# Patient Record
Sex: MALE | Race: AMERICAN INDIAN OR ALASKA NATIVE | NOT HISPANIC OR LATINO | Employment: OTHER | URBAN - METROPOLITAN AREA
[De-identification: names, ages, dates, MRNs, and addresses within clinical notes are randomized per-mention and may not be internally consistent; named-entity substitution may affect disease eponyms.]

---

## 2017-04-20 PROBLEM — R31.29 HEMATURIA, MICROSCOPIC: Status: ACTIVE | Noted: 2017-04-20

## 2017-09-27 PROBLEM — G47.33 OSA (OBSTRUCTIVE SLEEP APNEA): Status: ACTIVE | Noted: 2017-09-27

## 2017-09-28 PROBLEM — R06.02 SOB (SHORTNESS OF BREATH): Status: ACTIVE | Noted: 2017-09-28

## 2017-10-03 PROBLEM — G56.01 CARPAL TUNNEL SYNDROME OF RIGHT WRIST: Status: ACTIVE | Noted: 2017-10-03

## 2017-10-05 ENCOUNTER — HOSPITAL ENCOUNTER (OUTPATIENT)
Dept: SLEEP MEDICINE | Facility: HOSPITAL | Age: 37
Discharge: HOME | End: 2017-10-05
Attending: PHYSICIAN ASSISTANT
Payer: MEDICARE

## 2017-10-05 DIAGNOSIS — G47.33 OBSTRUCTIVE SLEEP APNEA SYNDROME: ICD-10-CM

## 2017-10-05 PROCEDURE — 95810 POLYSOM 6/> YRS 4/> PARAM: CPT

## 2017-10-25 ENCOUNTER — HOSPITAL ENCOUNTER (OUTPATIENT)
Dept: SLEEP MEDICINE | Facility: HOSPITAL | Age: 37
Discharge: HOME | End: 2017-10-25
Attending: INTERNAL MEDICINE
Payer: MEDICARE

## 2017-10-25 DIAGNOSIS — G47.33 OBSTRUCTIVE SLEEP APNEA SYNDROME: ICD-10-CM

## 2017-10-25 PROCEDURE — 95811 POLYSOM 6/>YRS CPAP 4/> PARM: CPT

## 2018-01-07 PROBLEM — J45.30 MILD PERSISTENT ASTHMA WITHOUT COMPLICATION: Status: ACTIVE | Noted: 2018-01-07

## 2018-02-19 PROBLEM — R45.851 SUICIDAL IDEATION: Status: ACTIVE | Noted: 2018-02-19

## 2018-04-15 PROBLEM — R06.09 DOE (DYSPNEA ON EXERTION): Status: ACTIVE | Noted: 2018-04-15

## 2018-05-14 PROBLEM — R06.02 SHORTNESS OF BREATH: Status: ACTIVE | Noted: 2018-05-14

## 2018-05-14 PROBLEM — R05.3 CHRONIC COUGH: Status: ACTIVE | Noted: 2018-05-14

## 2018-07-02 ENCOUNTER — HOSPITAL ENCOUNTER (OUTPATIENT)
Dept: SLEEP MEDICINE | Facility: HOSPITAL | Age: 38
Discharge: HOME | End: 2018-07-02
Attending: INTERNAL MEDICINE
Payer: MEDICARE

## 2018-07-02 DIAGNOSIS — G47.33 OBSTRUCTIVE SLEEP APNEA (ADULT) (PEDIATRIC): ICD-10-CM

## 2018-07-02 PROCEDURE — 95811 POLYSOM 6/>YRS CPAP 4/> PARM: CPT

## 2018-07-02 PROCEDURE — 95811 POLYSOM 6/>YRS CPAP 4/> PARM: CPT | Mod: 26,,, | Performed by: INTERNAL MEDICINE

## 2018-08-27 PROBLEM — M25.511 RIGHT SHOULDER PAIN: Status: ACTIVE | Noted: 2018-08-27

## 2019-06-16 PROBLEM — S46.912A STRAIN OF LEFT SHOULDER: Status: ACTIVE | Noted: 2019-06-16

## 2020-01-22 PROBLEM — J45.30 MILD PERSISTENT ASTHMA WITHOUT COMPLICATION: Chronic | Status: ACTIVE | Noted: 2018-01-07

## 2020-05-05 PROBLEM — G40.919 INTRACTABLE EPILEPSY WITHOUT STATUS EPILEPTICUS: Status: ACTIVE | Noted: 2020-05-05

## 2020-05-05 PROBLEM — F12.20 CANNABIS DEPENDENCE, CONTINUOUS: Status: ACTIVE | Noted: 2020-05-05

## 2020-07-07 PROBLEM — R45.4 OUTBURSTS OF ANGER: Status: ACTIVE | Noted: 2020-07-07

## 2020-08-17 ENCOUNTER — HOSPITAL ENCOUNTER (OUTPATIENT)
Dept: SLEEP MEDICINE | Facility: HOSPITAL | Age: 40
Discharge: HOME | End: 2020-08-17
Attending: NURSE PRACTITIONER
Payer: MEDICARE

## 2020-08-17 DIAGNOSIS — G47.33 OBSTRUCTIVE SLEEP APNEA (ADULT) (PEDIATRIC): ICD-10-CM

## 2020-08-17 PROCEDURE — 95810 POLYSOM 6/> YRS 4/> PARAM: CPT | Mod: 26,,, | Performed by: INTERNAL MEDICINE

## 2020-08-17 PROCEDURE — 95810 POLYSOM 6/> YRS 4/> PARAM: CPT

## 2020-08-17 PROCEDURE — 95810 PR POLYSOMNOGRAPHY, 4 OR MORE: ICD-10-PCS | Mod: 26,,, | Performed by: INTERNAL MEDICINE

## 2020-09-28 ENCOUNTER — HOSPITAL ENCOUNTER (EMERGENCY)
Facility: HOSPITAL | Age: 40
Discharge: HOME | End: 2020-09-28
Attending: SURGERY
Payer: MEDICARE

## 2020-09-28 VITALS
OXYGEN SATURATION: 99 % | BODY MASS INDEX: 27.48 KG/M2 | DIASTOLIC BLOOD PRESSURE: 80 MMHG | WEIGHT: 197 LBS | HEART RATE: 80 BPM | TEMPERATURE: 98 F | SYSTOLIC BLOOD PRESSURE: 131 MMHG | RESPIRATION RATE: 20 BRPM

## 2020-09-28 DIAGNOSIS — Z20.822 COVID-19 VIRUS NOT DETECTED: Primary | ICD-10-CM

## 2020-09-28 LAB — SARS-COV-2 RDRP RESP QL NAA+PROBE: NEGATIVE

## 2020-09-28 PROCEDURE — 99282 EMERGENCY DEPT VISIT SF MDM: CPT

## 2020-09-28 PROCEDURE — U0002 COVID-19 LAB TEST NON-CDC: HCPCS

## 2020-09-28 RX ORDER — AZITHROMYCIN 250 MG/1
TABLET, FILM COATED ORAL
Qty: 6 TABLET | Refills: 0 | Status: SHIPPED | OUTPATIENT
Start: 2020-09-28 | End: 2020-09-28 | Stop reason: ALTCHOICE

## 2020-09-28 RX ORDER — METHYLPREDNISOLONE 4 MG/1
TABLET ORAL
Qty: 1 PACKAGE | Refills: 0 | Status: SHIPPED | OUTPATIENT
Start: 2020-09-28 | End: 2020-09-28 | Stop reason: ALTCHOICE

## 2020-09-28 NOTE — ED PROVIDER NOTES
"[facility]                                                 Chief Complaint  40 y.o. male with COVID-19 Concerns ("i need the covid test for a sleep study")      History of Present Illness  Jostin Lim Jr. presents to the ER for COVID test this morning  Patient has no syndesmosis just like a test on ER evaluation this moring  Pt states that they have been in contact with several people with COVID virus  Patient has no evidence of hypoxia, no fever on  emergency room triage    The history is provided by the patient   device was not used during this ER visit    Past Medical History:   Diagnosis Date    Addiction to drug     marijuana 1-2 times daily    Asthma     Back pain     Carpal tunnel syndrome     GERD (gastroesophageal reflux disease)     Hand fracture     Insomnia     Knee pain     Seizures     2010    Sleep apnea     Substance abuse     marijuana     Past Surgical History:   Procedure Laterality Date    CARPAL TUNNEL RELEASE Right     CLOSED REDUCTION OF FRACTURE OF HAND WITH PERCUTANEOUS PINNING Right 1/23/2020    Procedure: CLOSED REDUCTION, FRACTURE, HAND, WITH PERCUTANEOUS PINNING;  Surgeon: Buck Velez MD;  Location: [facility];  Service: Orthopedics;  Laterality: Right;  4th and 5th [facility] DISLOCATION    ESOPHAGOGASTRODUODENOSCOPY N/A 8/3/2018    Procedure: EGD (ESOPHAGOGASTRODUODENOSCOPY);  Surgeon: Cl Soto MD;  Location: [facility];  Service: Endoscopy;  Laterality: N/A;    HERNIA REPAIR      VASECTOMY        Review of patient's allergies indicates:   Allergen Reactions    Percocet [oxycodone-acetaminophen] Hallucinations      I have reviewed all of this patient's past medical, surgical, family, and social   histories as well as active allergies and medications documented in the  electronic medical record    Review of Systems and Physical Exam      Review of Systems  -- Constitution - no fever, denies fatigue, no weakness, " no chills  -- Eyes - no tearing or redness, no visual disturbance  -- Ear, Nose - no tinnitus or earache, no nasal congestion or discharge  -- Mouth,Throat - no sore throat, no toothache, denies voice change, normal swallowing  -- Respiratory - denies cough and congestion, no shortness of breath, no REYNOSO, no wheeze  -- Cardiovascular - denies chest pain, no palpitations, denies claudication. No orthopnea   -- Gastrointestinal - denies abdominal pain, nausea, vomiting, diarrhea, or constipation.   -- Genitourinary - denies flank pain and dysuria, also denies frequency or urgency  -- Musculoskeletal - denies back pain, negative for myalgias and arthralgias.   -- Neurological - no headache, denies weakness or seizure; no loss of consciousness  -- Skin - denies pallor, rash, or changes in skin, no hives or welts    Vital Signs  His oral temperature is 98 °F (36.7 °C).   His pulse is 80.   His respiration is 20 and oxygen saturation is 99%.     Physical Exam  -- Nursing note and vitals reviewed  -- Constitutional: Appears well-developed and well-nourished  -- Head: Atraumatic. Normocephalic. No obvious abnormality  -- Eyes: Pupils are equal and reactive to light. Normal conjunctiva and lids  -- Nose: Nose normal in appearance, nares grossly normal. No discharge  -- Throat: Mucous membranes moist, pharynx normal, normal tonsils. No lesions   -- Ears: External ears and TM normal bilaterally. Normal hearing and no drainage  -- Neck: Normal range of motion. Neck supple. No masses, trachea midline  -- Cardiac: Normal rate, regular rhythm and normal heart sounds  -- Pulmonary: Normal respiratory effort, breath sounds clear to auscultation  -- Abdominal: Soft, no tenderness. Normal bowel sounds. Normal liver edge  -- Musculoskeletal: Normal range of motion, no effusions. Joints stable   -- Neurological: No focal deficits. Showed good interaction with staff  -- Vascular: Posterior tibial, dorsalis pedis and radial pulses 2+  bilaterally      Emergency Room Course      Treatment and Evaluation  -- rapid Coronavirus PCR was negative    Diagnosis  [[id number]] Covid-19 Virus not Detected (Primary)    Disposition and Plan  -- Disposition: home  -- Condition: stable  -- Follow-up: Patient to follow up with Arvin Ramires MD in 1-2 days.  -- I advised the patient that we have found no life threatening condition today  -- At this time, I believe the patient is clinically stable for discharge.   -- The patient acknowledges that close follow up with a MD is required   -- Patient agrees to comply with all instruction and direction given in the ER    This note is dictated on M*Modal word recognition program.  There are word recognition mistakes that are occasionally missed on review.         Cristofer Terrell MD  09/28/20 1144

## 2020-09-28 NOTE — ED TRIAGE NOTES
"40 y.o. male presents to ER RWR 02/RWR 02   Chief Complaint   Patient presents with    COVID-19 Concerns     "i need the covid test for a sleep study"   . No acute distress noted.  "

## 2020-09-30 ENCOUNTER — HOSPITAL ENCOUNTER (OUTPATIENT)
Dept: SLEEP MEDICINE | Facility: HOSPITAL | Age: 40
Discharge: HOME | End: 2020-09-30
Attending: NURSE PRACTITIONER
Payer: MEDICARE

## 2020-09-30 DIAGNOSIS — G47.33 OBSTRUCTIVE SLEEP APNEA (ADULT) (PEDIATRIC): ICD-10-CM

## 2020-09-30 PROCEDURE — 95811 POLYSOM 6/>YRS CPAP 4/> PARM: CPT

## 2020-09-30 PROCEDURE — 95811 POLYSOM 6/>YRS CPAP 4/> PARM: CPT | Mod: 26,,, | Performed by: INTERNAL MEDICINE

## 2020-09-30 PROCEDURE — 95811 PR POLYSOMNOGRAPHY W/CPAP: ICD-10-PCS | Mod: 26,,, | Performed by: INTERNAL MEDICINE

## 2021-01-04 PROBLEM — K08.89 PAIN, DENTAL: Status: ACTIVE | Noted: 2021-01-04

## 2022-01-13 ENCOUNTER — LAB VISIT (OUTPATIENT)
Dept: PRIMARY CARE CLINIC | Facility: OTHER | Age: 42
End: 2022-01-13
Attending: INTERNAL MEDICINE
Payer: MEDICARE

## 2022-01-13 DIAGNOSIS — Z20.822 ENCOUNTER FOR LABORATORY TESTING FOR COVID-19 VIRUS: Primary | ICD-10-CM

## 2022-01-13 PROCEDURE — U0003 INFECTIOUS AGENT DETECTION BY NUCLEIC ACID (DNA OR RNA); SEVERE ACUTE RESPIRATORY SYNDROME CORONAVIRUS 2 (SARS-COV-2) (CORONAVIRUS DISEASE [COVID-19]), AMPLIFIED PROBE TECHNIQUE, MAKING USE OF HIGH THROUGHPUT TECHNOLOGIES AS DESCRIBED BY CMS-2020-01-R: HCPCS | Performed by: INTERNAL MEDICINE

## 2022-01-14 DIAGNOSIS — U07.1 COVID-19 VIRUS DETECTED: ICD-10-CM

## 2022-01-14 LAB
SARS-COV-2 RNA RESP QL NAA+PROBE: DETECTED
SARS-COV-2- CYCLE NUMBER: 20

## 2022-03-15 ENCOUNTER — ANESTHESIA EVENT (OUTPATIENT)
Dept: SURGERY | Facility: HOSPITAL | Age: 42
End: 2022-03-15
Payer: MEDICARE

## 2022-03-15 ENCOUNTER — ANESTHESIA (OUTPATIENT)
Dept: SURGERY | Facility: HOSPITAL | Age: 42
End: 2022-03-15
Payer: MEDICARE

## 2022-03-15 ENCOUNTER — HOSPITAL ENCOUNTER (OUTPATIENT)
Facility: HOSPITAL | Age: 42
Discharge: HOME | End: 2022-03-15
Attending: STUDENT IN AN ORGANIZED HEALTH CARE EDUCATION/TRAINING PROGRAM | Admitting: SURGERY
Payer: MEDICARE

## 2022-03-15 VITALS
WEIGHT: 190.38 LBS | DIASTOLIC BLOOD PRESSURE: 85 MMHG | TEMPERATURE: 97 F | OXYGEN SATURATION: 96 % | HEART RATE: 62 BPM | RESPIRATION RATE: 18 BRPM | BODY MASS INDEX: 28.11 KG/M2 | SYSTOLIC BLOOD PRESSURE: 124 MMHG

## 2022-03-15 DIAGNOSIS — N34.0 ABSCESS OF DEEP PERINEAL SPACE: ICD-10-CM

## 2022-03-15 DIAGNOSIS — L02.91 ABSCESS: ICD-10-CM

## 2022-03-15 DIAGNOSIS — L02.214 ABSCESS OF LEFT GROIN: Primary | ICD-10-CM

## 2022-03-15 LAB
ALBUMIN SERPL BCP-MCNC: 3.8 G/DL (ref 3.5–5.2)
ALP SERPL-CCNC: 115 U/L (ref 55–135)
ALT SERPL W/O P-5'-P-CCNC: 11 U/L (ref 10–44)
ANION GAP SERPL CALC-SCNC: 11 MMOL/L (ref 8–16)
AST SERPL-CCNC: 11 U/L (ref 10–40)
BASOPHILS # BLD AUTO: 0.04 K/UL (ref 0–0.2)
BASOPHILS NFR BLD: 0.4 % (ref 0–1.9)
BILIRUB SERPL-MCNC: 0.4 MG/DL (ref 0.1–1)
BUN SERPL-MCNC: 10 MG/DL (ref 6–20)
CALCIUM SERPL-MCNC: 9.6 MG/DL (ref 8.7–10.5)
CHLORIDE SERPL-SCNC: 95 MMOL/L (ref 95–110)
CO2 SERPL-SCNC: 27 MMOL/L (ref 23–29)
CREAT SERPL-MCNC: 1.1 MG/DL (ref 0.5–1.4)
DIFFERENTIAL METHOD: ABNORMAL
EOSINOPHIL # BLD AUTO: 0 K/UL (ref 0–0.5)
EOSINOPHIL NFR BLD: 0.4 % (ref 0–8)
ERYTHROCYTE [DISTWIDTH] IN BLOOD BY AUTOMATED COUNT: 12.5 % (ref 11.5–14.5)
EST. GFR  (AFRICAN AMERICAN): >60 ML/MIN/1.73 M^2
EST. GFR  (NON AFRICAN AMERICAN): >60 ML/MIN/1.73 M^2
GLUCOSE SERPL-MCNC: 98 MG/DL (ref 70–110)
HCT VFR BLD AUTO: 36.3 % (ref 40–54)
HGB BLD-MCNC: 12.6 G/DL (ref 14–18)
IMM GRANULOCYTES # BLD AUTO: 0.03 K/UL (ref 0–0.04)
IMM GRANULOCYTES NFR BLD AUTO: 0.3 % (ref 0–0.5)
LACTATE SERPL-SCNC: 0.7 MMOL/L (ref 0.5–2.2)
LYMPHOCYTES # BLD AUTO: 1.8 K/UL (ref 1–4.8)
LYMPHOCYTES NFR BLD: 19.7 % (ref 18–48)
MCH RBC QN AUTO: 33.7 PG (ref 27–31)
MCHC RBC AUTO-ENTMCNC: 34.7 G/DL (ref 32–36)
MCV RBC AUTO: 97 FL (ref 82–98)
MONOCYTES # BLD AUTO: 0.7 K/UL (ref 0.3–1)
MONOCYTES NFR BLD: 7.5 % (ref 4–15)
NEUTROPHILS # BLD AUTO: 6.6 K/UL (ref 1.8–7.7)
NEUTROPHILS NFR BLD: 71.7 % (ref 38–73)
NRBC BLD-RTO: 0 /100 WBC
PLATELET # BLD AUTO: 249 K/UL (ref 150–450)
PMV BLD AUTO: 8.7 FL (ref 9.2–12.9)
POTASSIUM SERPL-SCNC: 3.5 MMOL/L (ref 3.5–5.1)
PROCALCITONIN SERPL IA-MCNC: 0.03 NG/ML
PROT SERPL-MCNC: 7.6 G/DL (ref 6–8.4)
RBC # BLD AUTO: 3.74 M/UL (ref 4.6–6.2)
SODIUM SERPL-SCNC: 133 MMOL/L (ref 136–145)
WBC # BLD AUTO: 9.15 K/UL (ref 3.9–12.7)

## 2022-03-15 PROCEDURE — 99285 EMERGENCY DEPT VISIT HI MDM: CPT | Mod: 25

## 2022-03-15 PROCEDURE — 25000003 PHARM REV CODE 250: Performed by: NURSE ANESTHETIST, CERTIFIED REGISTERED

## 2022-03-15 PROCEDURE — 87075 CULTR BACTERIA EXCEPT BLOOD: CPT | Mod: 59 | Performed by: SURGERY

## 2022-03-15 PROCEDURE — 63600175 PHARM REV CODE 636 W HCPCS: Performed by: STUDENT IN AN ORGANIZED HEALTH CARE EDUCATION/TRAINING PROGRAM

## 2022-03-15 PROCEDURE — 25000003 PHARM REV CODE 250: Performed by: NURSE PRACTITIONER

## 2022-03-15 PROCEDURE — 87070 CULTURE OTHR SPECIMN AEROBIC: CPT | Mod: 59 | Performed by: SURGERY

## 2022-03-15 PROCEDURE — 96361 HYDRATE IV INFUSION ADD-ON: CPT | Mod: 59

## 2022-03-15 PROCEDURE — 83605 ASSAY OF LACTIC ACID: CPT | Performed by: NURSE PRACTITIONER

## 2022-03-15 PROCEDURE — 94760 N-INVAS EAR/PLS OXIMETRY 1: CPT | Mod: 59

## 2022-03-15 PROCEDURE — 84145 PROCALCITONIN (PCT): CPT | Performed by: NURSE PRACTITIONER

## 2022-03-15 PROCEDURE — 71000033 HC RECOVERY, INTIAL HOUR: Performed by: SURGERY

## 2022-03-15 PROCEDURE — 36000704 HC OR TIME LEV I 1ST 15 MIN: Performed by: SURGERY

## 2022-03-15 PROCEDURE — 96374 THER/PROPH/DIAG INJ IV PUSH: CPT | Mod: 59

## 2022-03-15 PROCEDURE — 10061 I&D ABSCESS COMP/MULTIPLE: CPT | Mod: ,,, | Performed by: SURGERY

## 2022-03-15 PROCEDURE — 63600175 PHARM REV CODE 636 W HCPCS: Performed by: NURSE ANESTHETIST, CERTIFIED REGISTERED

## 2022-03-15 PROCEDURE — 80053 COMPREHEN METABOLIC PANEL: CPT | Performed by: NURSE PRACTITIONER

## 2022-03-15 PROCEDURE — 10061 PR DRAIN SKIN ABSCESS COMPLIC: ICD-10-PCS | Mod: ,,, | Performed by: SURGERY

## 2022-03-15 PROCEDURE — 87040 BLOOD CULTURE FOR BACTERIA: CPT | Mod: 59 | Performed by: NURSE PRACTITIONER

## 2022-03-15 PROCEDURE — 87076 CULTURE ANAEROBE IDENT EACH: CPT | Mod: 59 | Performed by: SURGERY

## 2022-03-15 PROCEDURE — 96372 THER/PROPH/DIAG INJ SC/IM: CPT | Mod: 59 | Performed by: STUDENT IN AN ORGANIZED HEALTH CARE EDUCATION/TRAINING PROGRAM

## 2022-03-15 PROCEDURE — 00300 ANES ALL PX INTEG H/N/PTRUNK: CPT | Performed by: SURGERY

## 2022-03-15 PROCEDURE — 85025 COMPLETE CBC W/AUTO DIFF WBC: CPT | Performed by: NURSE PRACTITIONER

## 2022-03-15 PROCEDURE — 25500020 PHARM REV CODE 255: Performed by: STUDENT IN AN ORGANIZED HEALTH CARE EDUCATION/TRAINING PROGRAM

## 2022-03-15 PROCEDURE — 37000008 HC ANESTHESIA 1ST 15 MINUTES: Performed by: SURGERY

## 2022-03-15 PROCEDURE — 63600175 PHARM REV CODE 636 W HCPCS: Performed by: NURSE PRACTITIONER

## 2022-03-15 PROCEDURE — 36000705 HC OR TIME LEV I EA ADD 15 MIN: Performed by: SURGERY

## 2022-03-15 PROCEDURE — 00400 ANES INTEGUMENTARY SYS NOS: CPT | Mod: QZ | Performed by: NURSE ANESTHETIST, CERTIFIED REGISTERED

## 2022-03-15 PROCEDURE — 37000009 HC ANESTHESIA EA ADD 15 MINS: Performed by: SURGERY

## 2022-03-15 RX ORDER — PROPOFOL 10 MG/ML
INJECTION, EMULSION INTRAVENOUS
Status: DISCONTINUED | OUTPATIENT
Start: 2022-03-15 | End: 2022-03-15

## 2022-03-15 RX ORDER — LIDOCAINE HYDROCHLORIDE 10 MG/ML
10 INJECTION, SOLUTION EPIDURAL; INFILTRATION; INTRACAUDAL; PERINEURAL
Status: DISCONTINUED | OUTPATIENT
Start: 2022-03-15 | End: 2022-03-15 | Stop reason: HOSPADM

## 2022-03-15 RX ORDER — KETOROLAC TROMETHAMINE 30 MG/ML
15 INJECTION, SOLUTION INTRAMUSCULAR; INTRAVENOUS
Status: COMPLETED | OUTPATIENT
Start: 2022-03-15 | End: 2022-03-15

## 2022-03-15 RX ORDER — ONDANSETRON 2 MG/ML
INJECTION INTRAMUSCULAR; INTRAVENOUS
Status: DISCONTINUED | OUTPATIENT
Start: 2022-03-15 | End: 2022-03-15

## 2022-03-15 RX ORDER — HYDROCODONE BITARTRATE AND ACETAMINOPHEN 5; 325 MG/1; MG/1
1 TABLET ORAL EVERY 4 HOURS PRN
Status: DISCONTINUED | OUTPATIENT
Start: 2022-03-15 | End: 2022-03-15 | Stop reason: HOSPADM

## 2022-03-15 RX ORDER — VANCOMYCIN 2 GRAM/500 ML IN 0.9 % SODIUM CHLORIDE INTRAVENOUS
2000 ONCE
Status: DISCONTINUED | OUTPATIENT
Start: 2022-03-15 | End: 2022-03-15 | Stop reason: HOSPADM

## 2022-03-15 RX ORDER — AMOXICILLIN AND CLAVULANATE POTASSIUM 875; 125 MG/1; MG/1
1 TABLET, FILM COATED ORAL EVERY 12 HOURS
Qty: 10 TABLET | Refills: 0 | Status: SHIPPED | OUTPATIENT
Start: 2022-03-15 | End: 2022-03-20

## 2022-03-15 RX ORDER — SODIUM CHLORIDE 9 MG/ML
1000 INJECTION, SOLUTION INTRAVENOUS
Status: COMPLETED | OUTPATIENT
Start: 2022-03-15 | End: 2022-03-15

## 2022-03-15 RX ORDER — MIDAZOLAM HYDROCHLORIDE 1 MG/ML
INJECTION INTRAMUSCULAR; INTRAVENOUS
Status: DISCONTINUED | OUTPATIENT
Start: 2022-03-15 | End: 2022-03-15

## 2022-03-15 RX ORDER — FENTANYL CITRATE 50 UG/ML
INJECTION, SOLUTION INTRAMUSCULAR; INTRAVENOUS
Status: DISCONTINUED | OUTPATIENT
Start: 2022-03-15 | End: 2022-03-15

## 2022-03-15 RX ORDER — HYDROCODONE BITARTRATE AND ACETAMINOPHEN 5; 325 MG/1; MG/1
1 TABLET ORAL EVERY 6 HOURS PRN
Qty: 20 TABLET | Refills: 0 | Status: SHIPPED | OUTPATIENT
Start: 2022-03-15 | End: 2022-07-27

## 2022-03-15 RX ORDER — HYDROMORPHONE HYDROCHLORIDE 1 MG/ML
1 INJECTION, SOLUTION INTRAMUSCULAR; INTRAVENOUS; SUBCUTANEOUS EVERY 4 HOURS PRN
Status: DISCONTINUED | OUTPATIENT
Start: 2022-03-15 | End: 2022-03-15 | Stop reason: HOSPADM

## 2022-03-15 RX ORDER — ONDANSETRON 2 MG/ML
4 INJECTION INTRAMUSCULAR; INTRAVENOUS EVERY 12 HOURS PRN
Status: DISCONTINUED | OUTPATIENT
Start: 2022-03-15 | End: 2022-03-15 | Stop reason: HOSPADM

## 2022-03-15 RX ORDER — DEXAMETHASONE SODIUM PHOSPHATE 4 MG/ML
INJECTION, SOLUTION INTRA-ARTICULAR; INTRALESIONAL; INTRAMUSCULAR; INTRAVENOUS; SOFT TISSUE
Status: DISCONTINUED | OUTPATIENT
Start: 2022-03-15 | End: 2022-03-15

## 2022-03-15 RX ORDER — LIDOCAINE HCL/PF 100 MG/5ML
SYRINGE (ML) INTRAVENOUS
Status: DISCONTINUED | OUTPATIENT
Start: 2022-03-15 | End: 2022-03-15

## 2022-03-15 RX ORDER — KETOROLAC TROMETHAMINE 30 MG/ML
INJECTION, SOLUTION INTRAMUSCULAR; INTRAVENOUS
Status: DISCONTINUED | OUTPATIENT
Start: 2022-03-15 | End: 2022-03-15

## 2022-03-15 RX ORDER — SODIUM CHLORIDE 9 MG/ML
INJECTION, SOLUTION INTRAVENOUS CONTINUOUS
Status: DISCONTINUED | OUTPATIENT
Start: 2022-03-15 | End: 2022-03-15 | Stop reason: HOSPADM

## 2022-03-15 RX ORDER — SODIUM CHLORIDE, SODIUM LACTATE, POTASSIUM CHLORIDE, CALCIUM CHLORIDE 600; 310; 30; 20 MG/100ML; MG/100ML; MG/100ML; MG/100ML
INJECTION, SOLUTION INTRAVENOUS CONTINUOUS PRN
Status: DISCONTINUED | OUTPATIENT
Start: 2022-03-15 | End: 2022-03-15

## 2022-03-15 RX ADMIN — KETOROLAC TROMETHAMINE 30 MG: 30 INJECTION, SOLUTION INTRAMUSCULAR at 05:03

## 2022-03-15 RX ADMIN — SODIUM CHLORIDE 1000 ML: 0.9 INJECTION, SOLUTION INTRAVENOUS at 01:03

## 2022-03-15 RX ADMIN — PROPOFOL 180 MG: 10 INJECTION, EMULSION INTRAVENOUS at 05:03

## 2022-03-15 RX ADMIN — KETOROLAC TROMETHAMINE 15 MG: 30 INJECTION, SOLUTION INTRAMUSCULAR; INTRAVENOUS at 01:03

## 2022-03-15 RX ADMIN — MIDAZOLAM HYDROCHLORIDE 2 MG: 1 INJECTION, SOLUTION INTRAMUSCULAR; INTRAVENOUS at 05:03

## 2022-03-15 RX ADMIN — FENTANYL CITRATE 50 MCG: 50 INJECTION, SOLUTION INTRAMUSCULAR; INTRAVENOUS at 05:03

## 2022-03-15 RX ADMIN — SODIUM CHLORIDE, SODIUM LACTATE, POTASSIUM CHLORIDE, AND CALCIUM CHLORIDE: .6; .31; .03; .02 INJECTION, SOLUTION INTRAVENOUS at 05:03

## 2022-03-15 RX ADMIN — DEXAMETHASONE SODIUM PHOSPHATE 4 MG: 4 INJECTION, SOLUTION INTRAMUSCULAR; INTRAVENOUS at 05:03

## 2022-03-15 RX ADMIN — IOHEXOL 100 ML: 350 INJECTION, SOLUTION INTRAVENOUS at 02:03

## 2022-03-15 RX ADMIN — FENTANYL CITRATE 150 MCG: 50 INJECTION, SOLUTION INTRAMUSCULAR; INTRAVENOUS at 05:03

## 2022-03-15 RX ADMIN — LIDOCAINE HYDROCHLORIDE 50 MG: 20 INJECTION, SOLUTION INTRAVENOUS at 05:03

## 2022-03-15 RX ADMIN — ONDANSETRON 8 MG: 2 INJECTION, SOLUTION INTRAMUSCULAR; INTRAVENOUS at 05:03

## 2022-03-15 RX ADMIN — PIPERACILLIN AND TAZOBACTAM 4.5 G: 4; .5 INJECTION, POWDER, LYOPHILIZED, FOR SOLUTION INTRAVENOUS; PARENTERAL at 04:03

## 2022-03-15 NOTE — ED PROVIDER NOTES
Encounter Date: 3/15/2022       History     Chief Complaint   Patient presents with    Abscess     C/o possible abscesses between left side of buttock and scrotum x 2 days.     Jostin Lim Jr. is a 41 y.o. male with PMH of asthma, back pain, GERD, insomnia, sleep apnea, substance abuse who presents to the ED for evaluation of gluteal abscess.  Patient reports initial symptoms started X 2 days ago when he felt a left gluteal bump that was painful.   He notes increasing pain and swelling with drainage since yesterday that is now moving into his groin and testicle area.   Pain is sharp, throbbing currently rated 8/10 in severity. He notes that he is unable to sit on his left side d/t pain.   He denies fever; does note + previous hx of skin abscess.     The history is provided by the patient.     Review of patient's allergies indicates:   Allergen Reactions    Percocet [oxycodone-acetaminophen] Hallucinations    Maxalt [rizatriptan] Other (See Comments)     Past Medical History:   Diagnosis Date    Addiction to drug     marijuana 1-2 times daily    Asthma     Back pain     Carpal tunnel syndrome     GERD (gastroesophageal reflux disease)     Hand fracture     Insomnia     Knee pain     Seizures     2010    Sleep apnea     Substance abuse     marijuana     Past Surgical History:   Procedure Laterality Date    CARPAL TUNNEL RELEASE Right     CLOSED REDUCTION OF FRACTURE OF HAND WITH PERCUTANEOUS PINNING Right 1/23/2020    Procedure: CLOSED REDUCTION, FRACTURE, HAND, WITH PERCUTANEOUS PINNING;  Surgeon: Buck Velez MD;  Location: [facility];  Service: Orthopedics;  Laterality: Right;  4th and 5th [facility] DISLOCATION    ESOPHAGOGASTRODUODENOSCOPY N/A 8/3/2018    Procedure: EGD (ESOPHAGOGASTRODUODENOSCOPY);  Surgeon: Cl Soto MD;  Location: [facility];  Service: Endoscopy;  Laterality: N/A;    HERNIA REPAIR      VASECTOMY       Family History   Problem Relation Age of Onset    Alcohol  abuse Mother     No Known Problems Father     No Known Problems Sister     No Known Problems Sister     No Known Problems Brother      Social History     Tobacco Use    Smoking status: Former Smoker     Packs/day: 0.50     Types: Cigarettes     Start date:      Quit date: 2010     Years since quittin.9    Smokeless tobacco: Never Used    Tobacco comment: pt currently denies   Substance Use Topics    Alcohol use: Yes     Alcohol/week: 0.0 standard drinks     Comment: occassionally    Drug use: Yes     Frequency: 14.0 times per week     Types: Marijuana     Comment: 1-2 daily     Review of Systems   Constitutional: Negative for appetite change, chills and fever.   HENT: Negative for congestion, ear discharge, ear pain, postnasal drip, rhinorrhea and sore throat.    Respiratory: Negative for cough, chest tightness and shortness of breath.    Cardiovascular: Negative for chest pain.   Gastrointestinal: Negative for abdominal distention, abdominal pain and nausea.   Genitourinary: Negative for dysuria, flank pain, hematuria and urgency.   Musculoskeletal: Negative for arthralgias and back pain.   Skin: Positive for wound (left gluteal abscess ). Negative for rash.   Neurological: Negative for dizziness, weakness, numbness and headaches.   Hematological: Does not bruise/bleed easily.       Physical Exam     Initial Vitals [03/15/22 1133]   BP Pulse Resp Temp SpO2   117/69 91 20 97.3 °F (36.3 °C) 100 %      MAP       --         Physical Exam    Nursing note and vitals reviewed.  Constitutional: He appears well-developed and well-nourished.   HENT:   Head: Normocephalic and atraumatic.   Eyes: Conjunctivae and EOM are normal. Pupils are equal, round, and reactive to light.   Neck: Neck supple.   Cardiovascular: Normal rate, regular rhythm, normal heart sounds and intact distal pulses.   Pulmonary/Chest: Breath sounds normal.   Abdominal: Abdomen is soft. Bowel sounds are normal.   Musculoskeletal:          General: Normal range of motion.      Cervical back: Neck supple.     Neurological: He is alert and oriented to person, place, and time. He has normal strength.   Skin: Skin is warm and dry. Capillary refill takes less than 2 seconds. Abscess (left gluteal/groin abscess> see photo) noted.   Psychiatric: He has a normal mood and affect. His behavior is normal. Judgment and thought content normal.             ED Course   Procedures  Labs Reviewed   CBC W/ AUTO DIFFERENTIAL - Abnormal; Notable for the following components:       Result Value    RBC 3.74 (*)     Hemoglobin 12.6 (*)     Hematocrit 36.3 (*)     MCH 33.7 (*)     MPV 8.7 (*)     All other components within normal limits   COMPREHENSIVE METABOLIC PANEL - Abnormal; Notable for the following components:    Sodium 133 (*)     All other components within normal limits   CULTURE, BLOOD   CULTURE, BLOOD   LACTIC ACID, PLASMA   PROCALCITONIN          Imaging Results          CT Abdomen Pelvis With Contrast (Final result)  Result time 03/15/22 15:33:58    Final result by Lon Hewitt Jr., MD (03/15/22 15:33:58)                 Impression:      There is a focal area of of cellulitis involving the upper medial left thigh extending into the intergluteal fold and into the perineum.  A central fluid collection consistent with abscess measures 4.1 x 2 cm.  Mild adenopathy is noted at the left groin.      Electronically signed by: Lon Hewitt MD  Date:    03/15/2022  Time:    15:33             Narrative:    EXAMINATION:  CT ABDOMEN PELVIS WITH CONTRAST    CLINICAL HISTORY:  Lymphadenopathy, groin;left gluteal abscess extending into testicle and groin;    TECHNIQUE:  Low dose axial images, sagittal and coronal reformations were obtained from the lung bases to the pubic symphysis following the IV administration of 100 mL of Omnipaque 350    COMPARISON:  CT abdomen and pelvis of September 12, 2017.    FINDINGS:  The liver is of normal size contour and CT  density without focal mass.  The gallbladder is of normal size without CT evidence of stone.  The pancreas is of normal contour and CT density without edema or mass.  The spleen is of normal size and CT density.    The adrenal glands are not enlarged.  The kidneys are of normal size contour and contrast enhancement without mass stone or hydronephrosis.  The abdominal aorta and inferior vena cava are of normal caliber.  Significant atherosclerotic calcification is not seen.  Retroperitoneal adenopathy or mass is not noted.    The stomach is of normal configuration.  Small bowel dilatation or air-fluid levels are not seen.  The colon appears of normal configuration without dilation or mass.  A normal appendix is noted.  The bladder is of normal contour without mass or asymmetry.  The prostate is not enlarged.    There is a focal area of cellulitis involving the medial left thigh extending into the intergluteal fold and into the perineum.  The main fluid collection in the crease between the thigh and the perineum, measures 4.1 by 2 cm with some air noted anteriorly.  It extends superiorly in 2 lobes in the crease and in the perineum.  There is mild adenopathy noted at the left groin the largest node having a short axis 1.4 cm.                                 Medications   LIDOcaine (PF) 10 mg/ml (1%) injection 100 mg (0 mg Infiltration Hold 3/15/22 1200)   vancomycin - pharmacy to dose (has no administration in time range)   vancomycin 2 g in 0.9% sodium chloride 500 mL IVPB (has no administration in time range)     And   vancomycin 1.25 g in dextrose 5% 250 mL IVPB (ready to mix) (has no administration in time range)   0.9%  NaCl infusion (has no administration in time range)   HYDROcodone-acetaminophen 5-325 mg per tablet 1 tablet (has no administration in time range)   HYDROmorphone injection 1 mg (has no administration in time range)   ondansetron injection 4 mg (has no administration in time range)   ketorolac  injection 15 mg (15 mg Intramuscular Given 3/15/22 1350)   0.9%  NaCl infusion (0 mLs Intravenous Stopped 3/15/22 1533)   iohexoL (OMNIPAQUE 350) injection 100 mL (100 mLs Intravenous Given 3/15/22 1451)   piperacillin-tazobactam 4.5 g in dextrose 5 % 100 mL IVPB (ready to mix system) (4.5 g Intravenous New Bag 3/15/22 1643)     Medical Decision Making:   Differential Diagnosis:   Cellulitis, abscess, Fourniers gangrene, perirectal abscess  Clinical Tests:   Lab Tests: Ordered and Reviewed  Radiological Study: Ordered and Reviewed  ED Management:  Evaluation of a 41-year-old male with left gluteal/ groin abscess for the past 2 days that is increasing in size.  See photo provided.  Lab workup, no leukocytosis.  CT showed There is a focal area of of cellulitis involving the upper medial left thigh extending into the intergluteal fold and into the perineum.  A central fluid collection consistent with abscess measures 4.1 x 2 cm.    Patient given IV vanc and Zosyn.  General surgery, Dr. Pleitez, consulted who agrees to take patient to the OR for incision and drainage.                       Clinical Impression:   Final diagnoses:  [L02.91] Abscess  [N34.0] Abscess of deep perineal space          ED Disposition Condition    Discharge Stable        ED Prescriptions     Medication Sig Dispense Start Date End Date Auth. Provider    amoxicillin-clavulanate 875-125mg (AUGMENTIN) 875-125 mg per tablet Take 1 tablet by mouth every 12 (twelve) hours. for 5 days 10 tablet 3/15/2022 3/20/2022 Boogie Pleitez Jr., MD    HYDROcodone-acetaminophen (NORCO) 5-325 mg per tablet Take 1 tablet by mouth every 6 (six) hours as needed for Pain. 20 tablet 3/15/2022  Boogie Pleitez Jr., MD        Follow-up Information     Follow up With Specialties Details Why Contact Info    Boogie Pleitez Jr., MD General Surgery Schedule an appointment as soon as possible for a visit in 1 week(s)  [address]   [address]  [facility]  [id number]  [phone]             Margot Kennedy, NP  03/15/22 1837

## 2022-03-15 NOTE — ANESTHESIA PREPROCEDURE EVALUATION
03/15/2022  Jostin Lim Jr. is a 41 y.o., male.      Pre-op Assessment    I have reviewed the Patient Summary Reports.     I have reviewed the Nursing Notes. I have reviewed the NPO Status.   I have reviewed the Medications.     Review of Systems  Social:  Non-Smoker, No Alcohol Use    Hematology/Oncology:  Hematology Normal   Oncology Normal     EENT/Dental:EENT/Dental Normal   Cardiovascular:   Exercise tolerance: good REYNOSO    Pulmonary:   Asthma mild Shortness of breath Sleep Apnea    Hepatic/GI:   GERD, well controlled    Musculoskeletal:  Musculoskeletal Normal    Neurological:   Neuromuscular Disease, Seizures    Endocrine:  Endocrine Normal    Dermatological:  Skin Normal    Psych:  Psychiatric Normal           Physical Exam  General: Well nourished    Chest/Lungs:  Clear to auscultation    Heart:  Rate: Normal    Abdomen:  Normal        Anesthesia Plan  Type of Anesthesia, risks & benefits discussed:    Anesthesia Type: Gen Supraglottic Airway  Intra-op Monitoring Plan: Standard ASA Monitors  Post Op Pain Control Plan: multimodal analgesia  Induction:  IV  Airway Plan: Direct, Post-Induction  Informed Consent: Informed consent signed with the Patient and all parties understand the risks and agree with anesthesia plan.  All questions answered.   ASA Score: 2 Emergent  Day of Surgery Review of History & Physical: H&P Update referred to the surgeon/provider.I have interviewed and examined the patient. I have reviewed the patient's H&P dated: 3/15/22. There are no significant changes.     Ready For Surgery From Anesthesia Perspective.     .

## 2022-03-15 NOTE — ANESTHESIA POSTPROCEDURE EVALUATION
Anesthesia Post Evaluation    Patient: Jostin Lim Jr.    Procedure(s) Performed: Procedure(s) (LRB):  INCISION AND DRAINAGE, ABSCESS (GLUTEAL) (Left)    Final Anesthesia Type: general      Patient location during evaluation: PACU  Patient participation: Yes- Able to Participate  Level of consciousness: awake and alert, oriented and awake  Post-procedure vital signs: reviewed and stable  Pain management: adequate  Airway patency: patent    PONV status at discharge: No PONV  Anesthetic complications: no      Cardiovascular status: blood pressure returned to baseline, hemodynamically stable and stable  Respiratory status: unassisted, spontaneous ventilation and room air  Hydration status: euvolemic  Follow-up not needed.          No case tracking events are documented in the log.      Pain/Mateusz Score: Pain Rating Prior to Med Admin: 10 (3/15/2022  1:50 PM)

## 2022-03-15 NOTE — PROGRESS NOTES
Pharmacokinetic Initial Assessment: IV Vancomycin    Assessment/Plan:    Initiate intravenous vancomycin with loading dose of 2000 mg once followed by a maintenance dose of vancomycin 1250mg IV every 12 hours  Desired empiric serum trough concentration is 10 to 20 mcg/mL  Draw vancomycin trough level 60 min prior to fourth dose on 3/17/22 at approximately 0400  Pharmacy will continue to follow and monitor vancomycin.      Please contact pharmacy at extension [phone] with any questions regarding this assessment.     Thank you for the consult,   Gabby Banegas       Patient brief summary:  Jostin Lim Jr. is a 41 y.o. male initiated on antimicrobial therapy with IV Vancomycin for treatment of suspected skin & soft tissue infection    Drug Allergies:   Review of patient's allergies indicates:   Allergen Reactions    Percocet [oxycodone-acetaminophen] Hallucinations    Maxalt [rizatriptan] Other (See Comments)       Actual Body Weight:   84.6 kg    Renal Function:   Estimated Creatinine Clearance: 95.3 mL/min (based on SCr of 1.1 mg/dL).,     Dialysis Method (if applicable):  N/A    CBC (last 72 hours):  Recent Labs   Lab Result Units 03/15/22  1319   WBC K/uL 9.15   Hemoglobin g/dL 12.6*   Hematocrit % 36.3*   Platelets K/uL 249   Gran % % 71.7   Lymph % % 19.7   Mono % % 7.5   Eosinophil % % 0.4   Basophil % % 0.4   Differential Method  Automated       Metabolic Panel (last 72 hours):  Recent Labs   Lab Result Units 03/15/22  1319   Sodium mmol/L 133*   Potassium mmol/L 3.5   Chloride mmol/L 95   CO2 mmol/L 27   Glucose mg/dL 98   BUN mg/dL 10   Creatinine mg/dL 1.1   Albumin g/dL 3.8   Total Bilirubin mg/dL 0.4   Alkaline Phosphatase U/L 115   AST U/L 11   ALT U/L 11       Drug levels (last 3 results):  No results for input(s): VANCOMYCINRA, VANCOMYCINPE, VANCOMYCINTR in the last 72 hours.    Microbiologic Results:  Microbiology Results (last 7 days)       Procedure Component Value Units Date/Time    Blood  culture [[id number]] Collected: 03/15/22 1319    Order Status: Sent Specimen: Blood from Antecubital, Left Updated: 03/15/22 1319    Blood culture [[id number]] Collected: 03/15/22 1319    Order Status: Sent Specimen: Blood from Antecubital, Right Updated: 03/15/22 1319

## 2022-03-15 NOTE — TRANSFER OF CARE
Anesthesia Transfer of Care Note    Patient: Jostin Lim Jr.    Procedure(s) Performed: Procedure(s) (LRB):  INCISION AND DRAINAGE, ABSCESS (GLUTEAL) (Left)    Patient location: PACU    Anesthesia Type: general    Transport from OR: Transported from OR on 6-10 L/min O2 by face mask with adequate spontaneous ventilation    Post pain: adequate analgesia    Post assessment: no apparent anesthetic complications and tolerated procedure well    Post vital signs: stable    Level of consciousness: sedated    Nausea/Vomiting: no nausea/vomiting    Complications: none    Transfer of care protocol was followed      Last vitals:   Visit Vitals  /69 (BP Location: Right arm, Patient Position: Sitting)   Pulse 91   Temp 36.3 °C (97.3 °F) (Oral)   Resp 20   Wt 86.4 kg (190 lb 5.9 oz)   SpO2 100%   BMI 28.11 kg/m²

## 2022-03-15 NOTE — Clinical Note
"Jostin"Evelin Lim was seen and treated in our emergency department on 3/15/2022.  He may return to work on 03/21/2022.       If you have any questions or concerns, please don't hesitate to call.      Román Snow, RN RN    "

## 2022-03-15 NOTE — BRIEF OP NOTE
[facility] - Surgery  Brief Operative Note    Surgery Date: 3/15/2022     Surgeon(s) and Role:     * Boogie Pleitez Jr., MD - Primary    Assisting Surgeon: None    Pre-op Diagnosis:  Abscess, gluteal, left [L02.31]    Post-op Diagnosis:  Post-Op Diagnosis Codes:     * Abscess, gluteal, left [L02.31]    Procedure(s) (LRB):  INCISION AND DRAINAGE, ABSCESS (GLUTEAL) (Left)    Anesthesia: General    Operative Findings:  Left sided abscess of perineum    Estimated Blood Loss:  5 mL         Specimens:  Cultures  Specimen (24h ago, onward)            None            Discharge Note    OUTCOME: Patient tolerated treatment/procedure well without complication and is now ready for discharge.    DISPOSITION: Home or Self Care    FINAL DIAGNOSIS:  Abscess of left groin    FOLLOWUP: In clinic    DISCHARGE INSTRUCTIONS:    Discharge Procedure Orders   Diet general     Remove dressing in 72 hours

## 2022-03-15 NOTE — H&P
Patient ID: Jostin Lim Jr. is a 41 y.o. male.    Chief Complaint: Abscess (C/o possible abscesses between left side of buttock and scrotum x 2 days.)      HPI:  Patient is a 41-year-old healthy male who comes into the ER with 2 day history of left groin and inner thigh pain as well as next to his left scrotum and left buttock area.  The pain progressively got worse.  Patient is not a diabetic.  He does not smoke.  He is not morbidly obese.  He does have sequela of hidradenitis in the left groin and inguinal region.  This is most likely what caused the abscess.  White count was [id number] in the ER.  CT scan demonstrated an abscess and some subcutaneous air in the left groin and inner thigh buttock region.  This will need to be opened and drained in the operating room.      Review of Systems   All other systems reviewed and are negative.      Current Facility-Administered Medications   Medication Dose Route Frequency Provider Last Rate Last Admin    LIDOcaine (PF) 10 mg/ml (1%) injection 100 mg  10 mL Infiltration ED 1 Time Leonardo Ortiz MD        vancomycin - pharmacy to dose   Intravenous pharmacy to manage frequency Margot Kennedy, ADAN        vancomycin 2 g in 0.9% sodium chloride 500 mL IVPB  2,000 mg Intravenous Once Leonardo Ortiz MD        And    [START ON 3/16/2022] vancomycin 1.25 g in dextrose 5% 250 mL IVPB (ready to mix)  1,250 mg Intravenous Q12H Leonardo Ortiz MD           Review of patient's allergies indicates:   Allergen Reactions    Percocet [oxycodone-acetaminophen] Hallucinations    Maxalt [rizatriptan] Other (See Comments)       Past Medical History:   Diagnosis Date    Addiction to drug     marijuana 1-2 times daily    Asthma     Back pain     Carpal tunnel syndrome     GERD (gastroesophageal reflux disease)     Hand fracture     Insomnia     Knee pain     Seizures     2010    Sleep apnea     Substance abuse     marijuana       Past Surgical History:   Procedure  "Laterality Date    CARPAL TUNNEL RELEASE Right     CLOSED REDUCTION OF FRACTURE OF HAND WITH PERCUTANEOUS PINNING Right 2020    Procedure: CLOSED REDUCTION, FRACTURE, HAND, WITH PERCUTANEOUS PINNING;  Surgeon: Buck Velez MD;  Location: [facility];  Service: Orthopedics;  Laterality: Right;  4th and 5th [facility]LOCATION    ESOPHAGOGASTRODUODENOSCOPY N/A 8/3/2018    Procedure: EGD (ESOPHAGOGASTRODUODENOSCOPY);  Surgeon: Cl Soto MD;  Location: [facility];  Service: Endoscopy;  Laterality: N/A;    HERNIA REPAIR      VASECTOMY         Family History   Problem Relation Age of Onset    Alcohol abuse Mother     No Known Problems Father     No Known Problems Sister     No Known Problems Sister     No Known Problems Brother        Social History     Socioeconomic History    Marital status:     Number of children: 4   Occupational History    Occupation: unemployed   Tobacco Use    Smoking status: Former Smoker     Packs/day: 0.50     Types: Cigarettes     Start date:      Quit date: 2010     Years since quittin.9    Smokeless tobacco: Never Used    Tobacco comment: pt currently denies   Substance and Sexual Activity    Alcohol use: Yes     Alcohol/week: 0.0 standard drinks     Comment: occassionally    Drug use: Yes     Frequency: 14.0 times per week     Types: Marijuana     Comment: 1-2 daily    Sexual activity: Yes     Partners: Female     Birth control/protection: None   Other Topics Concern    Patient feels they ought to cut down on drinking/drug use No    Patient annoyed by others criticizing their drinking/drug use No    Patient has felt bad or guilty about drinking/drug use No    Patient has had a drink/used drugs as an eye opener in the AM No   Social History Narrative    Patient denies needing help. Patient denies knowing anything about his family. Patient stated, "my mom gave me up when I was [age] years old. I have been in several foster homes, and I am " "just know getting to know them".    Patient stated this visit was a "cry for help".       Vitals:    03/15/22 1133   BP: 117/69   Pulse: 91   Resp: 20   Temp: 97.3 °F (36.3 °C)       Physical Exam  Vitals and nursing note reviewed.   Constitutional:       Appearance: He is well-developed.   HENT:      Head: Normocephalic.   Eyes:      Pupils: Pupils are equal, round, and reactive to light.   Pulmonary:      Effort: Pulmonary effort is normal.   Abdominal:      Palpations: Abdomen is soft.   Musculoskeletal:         General: Normal range of motion.      Cervical back: Normal range of motion.   Skin:     General: Skin is warm and dry.      Comments: Patient definitely has sequela of hidradenitis in the left inguinal region.  There is some swelling erythema and fluctuance in the left intertriginous region between the left scrotum and thigh and near the buttocks.  This is consistent with an abscess.  This will need to be opened and drained.   Neurological:      Mental Status: He is alert and oriented to person, place, and time.         Assessment & Plan:    left groin abscess  Incision and drainage in the operating room.     "

## 2022-03-15 NOTE — OP NOTE
[facility] - Surgery  Operative Note      Date of Procedure: 3/15/2022     Procedure: Procedure(s) (LRB):  INCISION AND DRAINAGE, ABSCESS (GLUTEAL) (Left)     Surgeon(s) and Role:     * Boogie Pleitez Jr., MD - Primary    Assisting Surgeon: None    Pre-Operative Diagnosis: Abscess, gluteal, left [L02.31]    Post-Operative Diagnosis: Post-Op Diagnosis Codes:     * Abscess, gluteal, left [L02.31]    Anesthesia: General    Operative Findings (including complications, if any):  Abscess of left perineum near the base of the left scrotum and perianal region    Description of Technical Procedures:  Patient was brought to the operating room after informed consent was obtained.  He was placed in lithotomy position.  Time-out was performed.  General anesthesia was established.  His perineum was prepped and draped in standard surgical fashion.  The obvious abscess and fluctuance was just below the base of the left scrotum in the perineum.  This was opened with a 15 blade immediately producing purulent drainage.  This was cultured.  I stuck my finger inside the tract and it tracked a little bit superior toward the base of the scrotum and I opened up the incision for total length of about 3 in.  The actual inside of the cavity of the abscess was all healthy and clean and actually had good granulation tissue.  I irrigated out the cavity with some diluted Betadine solution.  I then packed the wound open with 1 in iodoform gauze followed by some 4 x 4 and tape.    Significant Surgical Tasks Conducted by the Assistant(s), if Applicable:     Estimated Blood Loss (EBL):  5 mL           Implants: * No implants in log *    Specimens:  Cultures  Specimen (24h ago, onward)            None                  Condition: Good    Disposition: PACU - hemodynamically stable.    Attestation: I performed the procedure.    Discharge Note    OUTCOME: Patient tolerated treatment/procedure well without complication and is now ready for  discharge.    DISPOSITION: Home or Self Care    FINAL DIAGNOSIS:  Abscess of left groin    FOLLOWUP: In clinic    DISCHARGE INSTRUCTIONS:    Discharge Procedure Orders   Diet general     Remove dressing in 72 hours

## 2022-03-16 NOTE — NURSING
Patient discharged home to self care. All discharge criteria met. Patient and wife state understanding of care and follow up.

## 2022-03-19 LAB — BACTERIA SPEC AEROBE CULT: NO GROWTH

## 2022-03-21 LAB
BACTERIA BLD CULT: NORMAL
BACTERIA BLD CULT: NORMAL

## 2022-03-24 LAB
BACTERIA SPEC ANAEROBE CULT: ABNORMAL
BACTERIA SPEC ANAEROBE CULT: ABNORMAL

## 2023-03-10 ENCOUNTER — HOSPITAL ENCOUNTER (EMERGENCY)
Facility: HOSPITAL | Age: 43
Discharge: HOME | End: 2023-03-10
Attending: SURGERY
Payer: MEDICARE

## 2023-03-10 VITALS
DIASTOLIC BLOOD PRESSURE: 69 MMHG | OXYGEN SATURATION: 96 % | RESPIRATION RATE: 18 BRPM | TEMPERATURE: 99 F | WEIGHT: 186.19 LBS | SYSTOLIC BLOOD PRESSURE: 121 MMHG | HEART RATE: 78 BPM | BODY MASS INDEX: 25.25 KG/M2

## 2023-03-10 DIAGNOSIS — F45.42 PAIN DISORDER ASSOCIATED WITH PSYCHOLOGICAL AND PHYSICAL FACTORS: Primary | ICD-10-CM

## 2023-03-10 DIAGNOSIS — R51.9 NONINTRACTABLE HEADACHE, UNSPECIFIED CHRONICITY PATTERN, UNSPECIFIED HEADACHE TYPE: ICD-10-CM

## 2023-03-10 PROCEDURE — 63600175 PHARM REV CODE 636 W HCPCS: Performed by: SURGERY

## 2023-03-10 PROCEDURE — 99285 EMERGENCY DEPT VISIT HI MDM: CPT | Mod: 25

## 2023-03-10 PROCEDURE — 96372 THER/PROPH/DIAG INJ SC/IM: CPT | Performed by: SURGERY

## 2023-03-10 RX ORDER — MEPERIDINE HYDROCHLORIDE 25 MG/ML
25 INJECTION INTRAMUSCULAR; INTRAVENOUS; SUBCUTANEOUS
Status: COMPLETED | OUTPATIENT
Start: 2023-03-10 | End: 2023-03-10

## 2023-03-10 RX ORDER — IBUPROFEN 800 MG/1
800 TABLET ORAL EVERY 6 HOURS PRN
Qty: 20 TABLET | Refills: 0 | Status: SHIPPED | OUTPATIENT
Start: 2023-03-10 | End: 2023-10-19

## 2023-03-10 RX ORDER — ONDANSETRON 2 MG/ML
4 INJECTION INTRAMUSCULAR; INTRAVENOUS
Status: COMPLETED | OUTPATIENT
Start: 2023-03-10 | End: 2023-03-10

## 2023-03-10 RX ADMIN — ONDANSETRON HYDROCHLORIDE 4 MG: 2 SOLUTION INTRAMUSCULAR; INTRAVENOUS at 03:03

## 2023-03-10 RX ADMIN — MEPERIDINE HYDROCHLORIDE 25 MG: 25 INJECTION INTRAMUSCULAR; INTRAVENOUS; SUBCUTANEOUS at 03:03

## 2023-03-10 NOTE — ED PROVIDER NOTES
Encounter Date: 3/10/2023       History     Chief Complaint   Patient presents with    Headache     Patient to ER CC of a headache for the past few days, reports he does have a hx of migraines but the meds are not helping      Jostin Lim Jr. is a 42 y.o. male that presents with a headache today  He has longstanding issues with headaches, longstanding migraine headaches  Alert appropriate with a completely normal neurologic exam on ER evaluation  Patient has no photophobia, no nausea vomiting, normotensive on evaluation  Chronic headaches, chronic pain, states his medication did not help the patient    Review of patient's allergies indicates:   Allergen Reactions    Percocet [oxycodone-acetaminophen] Hallucinations    Maxalt [rizatriptan] Other (See Comments)     Past Medical History:   Diagnosis Date    Addiction to drug     marijuana 1-2 times daily    Asthma     Back pain     Carpal tunnel syndrome     GERD (gastroesophageal reflux disease)     Hand fracture     Insomnia     Knee pain     Seizures     2010    Sleep apnea     Substance abuse     marijuana     Past Surgical History:   Procedure Laterality Date    CARPAL TUNNEL RELEASE Right     CLOSED REDUCTION OF FRACTURE OF HAND WITH PERCUTANEOUS PINNING Right 1/23/2020    Procedure: CLOSED REDUCTION, FRACTURE, HAND, WITH PERCUTANEOUS PINNING;  Surgeon: Buck Velez MD;  Location: [facility];  Service: Orthopedics;  Laterality: Right;  4th and 5th [facility] DISLOCATION    ESOPHAGOGASTRODUODENOSCOPY N/A 8/3/2018    Procedure: EGD (ESOPHAGOGASTRODUODENOSCOPY);  Surgeon: Cl Soto MD;  Location: [facility];  Service: Endoscopy;  Laterality: N/A;    HERNIA REPAIR      INCISION AND DRAINAGE OF ABSCESS Left 3/15/2022    Procedure: INCISION AND DRAINAGE, ABSCESS (PERINEUM);  Surgeon: Boogie Pleitez Jr., MD;  Location: [facility];  Service: General;  Laterality: Left;    VASECTOMY       Family History   Problem Relation Age of Onset    Alcohol abuse Mother      No Known Problems Father     No Known Problems Sister     No Known Problems Sister     No Known Problems Brother      Social History     Tobacco Use    Smoking status: Former     Packs/day: 0.50     Types: Cigarettes     Start date:      Quit date: 2010     Years since quittin.9    Smokeless tobacco: Never    Tobacco comments:     pt currently denies   Substance Use Topics    Alcohol use: Yes     Alcohol/week: 0.0 standard drinks     Comment: occassionally    Drug use: Yes     Frequency: 14.0 times per week     Types: Marijuana     Comment: 1-2 daily     Review of Systems   Constitutional:  Negative for activity change, appetite change, fatigue, fever and unexpected weight change.   HENT:  Negative for congestion, ear pain, mouth sores, nosebleeds, rhinorrhea, sinus pressure, sneezing and sore throat.    Eyes:  Negative for pain, discharge, redness and itching.   Respiratory:  Negative for apnea, cough, chest tightness and shortness of breath.    Cardiovascular:  Negative for chest pain, palpitations and leg swelling.   Gastrointestinal:  Negative for abdominal distention, abdominal pain, anal bleeding, constipation, diarrhea, nausea and vomiting.   Endocrine: Negative.    Genitourinary:  Negative for dysuria, enuresis, flank pain and frequency.   Musculoskeletal:  Negative for arthralgias, back pain, neck pain and neck stiffness.   Skin:  Negative for color change and wound.   Allergic/Immunologic: Negative.    Neurological:  Positive for headaches. Negative for dizziness, tremors, syncope, facial asymmetry, speech difficulty, weakness, light-headedness and numbness.   Hematological:  Negative for adenopathy. Does not bruise/bleed easily.   Psychiatric/Behavioral:  Negative for agitation, behavioral problems, hallucinations, self-injury and suicidal ideas. The patient is not nervous/anxious.      Physical Exam     Initial Vitals [03/10/23 1354]   BP Pulse Resp Temp SpO2   121/69 81 18 98.5 °F  (36.9 °C) 96 %      MAP       --         Physical Exam    Nursing note and vitals reviewed.  Constitutional: Vital signs are normal. He appears well-developed and well-nourished. He is cooperative.   HENT:   Head: Normocephalic and atraumatic.   Right Ear: Hearing, tympanic membrane, external ear and ear canal normal.   Left Ear: Hearing, tympanic membrane, external ear and ear canal normal.   Nose: Nose normal.   Mouth/Throat: Uvula is midline, oropharynx is clear and moist and mucous membranes are normal.   Eyes: Conjunctivae, EOM and lids are normal. Pupils are equal, round, and reactive to light.   Neck: Neck supple. No JVD present.   Normal range of motion.   Full passive range of motion without pain.     Cardiovascular:  Normal rate, regular rhythm, S1 normal, S2 normal, normal heart sounds, intact distal pulses and normal pulses.           Pulmonary/Chest: Effort normal and breath sounds normal.   Abdominal: Abdomen is soft and flat. Bowel sounds are normal.   Musculoskeletal:         General: Normal range of motion.      Cervical back: Full passive range of motion without pain, normal range of motion and neck supple.     Neurological: He is alert and oriented to person, place, and time. He has normal strength.   Skin: Skin is warm, dry and intact. Capillary refill takes less than 2 seconds.       ED Course   Procedures  Labs Reviewed - No data to display       Imaging Results              CT Head Without Contrast (Final result)  Result time 03/10/23 14:50:14      Final result by Ana Delacruz MD (03/10/23 14:50:14)                   Impression:      No detrimental change or acute finding      Electronically signed by: Ana Delacruz MD  Date:    03/10/2023  Time:    14:50               Narrative:    EXAMINATION:  CT HEAD WITHOUT CONTRAST    CLINICAL HISTORY:  Headache, chronic, new features or increased frequency;    TECHNIQUE:  Low dose axial images were obtained through the head.  Coronal and  sagittal reformations were also performed. Contrast was not administered.    COMPARISON:  2021    FINDINGS:  The ventricular system is normal in size and not distorted by mass effect.    There is no parenchymal or extra-axial hemorrhage.  No finding to indicate neoplasm is present on this noncontrast study.  There is no major vascular territory region of acute or remote infarct.  Visualized paranasal sinuses demonstrate no significant sinus disease.                                       Medications   meperidine (PF) injection 25 mg (has no administration in time range)   ondansetron injection 4 mg (has no administration in time range)     Medical Decision Makin-year-old male with a migraine headache with a (-) head CT today in the ER  Demerol injection in the ER with a prescription of Motrin on ER discharge today  Chronic pain patient, follow-up PCP & Neurology, consider pain management  Patient counseled to return to the ER with any concerning symptoms on discharge                        Clinical Impression:   Final diagnoses:  [R51.9] Nonintractable headache, unspecified chronicity pattern, unspecified headache type  [F45.42] Pain disorder associated with psychological and physical factors (Primary)        ED Disposition Condition    Discharge Stable          ED Prescriptions       Medication Sig Dispense Start Date End Date Auth. Provider    ibuprofen (ADVIL,MOTRIN) 800 MG tablet Take 1 tablet (800 mg total) by mouth every 6 (six) hours as needed for Pain. 20 tablet 3/10/2023 -- Cristofer Terrell MD          Follow-up Information       Follow up With Specialties Details Why Contact Info    Nikolas Ramos MD Neurology Schedule an appointment as soon as possible for a visit in 2 days  [address]  [facility] [address]  [phone]               Cristofer Terrell MD  03/10/23 6529

## 2023-09-08 ENCOUNTER — HOSPITAL ENCOUNTER (EMERGENCY)
Facility: HOSPITAL | Age: 43
Discharge: HOME | End: 2023-09-08
Attending: SURGERY
Payer: MEDICARE

## 2023-09-08 VITALS
HEIGHT: 71 IN | OXYGEN SATURATION: 98 % | WEIGHT: 179.88 LBS | BODY MASS INDEX: 25.18 KG/M2 | RESPIRATION RATE: 16 BRPM | HEART RATE: 70 BPM | TEMPERATURE: 98 F | SYSTOLIC BLOOD PRESSURE: 109 MMHG | DIASTOLIC BLOOD PRESSURE: 70 MMHG

## 2023-09-08 DIAGNOSIS — Z76.89 ENCOUNTER FOR INCISION AND DRAINAGE PROCEDURE: Primary | ICD-10-CM

## 2023-09-08 DIAGNOSIS — L02.01 FACIAL ABSCESS: ICD-10-CM

## 2023-09-08 PROCEDURE — 25000003 PHARM REV CODE 250: Performed by: NURSE PRACTITIONER

## 2023-09-08 PROCEDURE — 10060 I&D ABSCESS SIMPLE/SINGLE: CPT

## 2023-09-08 PROCEDURE — 87070 CULTURE OTHR SPECIMN AEROBIC: CPT | Performed by: NURSE PRACTITIONER

## 2023-09-08 PROCEDURE — 99284 EMERGENCY DEPT VISIT MOD MDM: CPT | Mod: 25

## 2023-09-08 RX ORDER — LIDOCAINE HYDROCHLORIDE 10 MG/ML
10 INJECTION, SOLUTION EPIDURAL; INFILTRATION; INTRACAUDAL; PERINEURAL
Status: COMPLETED | OUTPATIENT
Start: 2023-09-08 | End: 2023-09-08

## 2023-09-08 RX ORDER — MUPIROCIN 20 MG/G
OINTMENT TOPICAL 3 TIMES DAILY
Qty: 15 G | Refills: 0 | Status: SHIPPED | OUTPATIENT
Start: 2023-09-08 | End: 2024-03-05 | Stop reason: ALTCHOICE

## 2023-09-08 RX ORDER — IBUPROFEN 800 MG/1
800 TABLET ORAL EVERY 8 HOURS PRN
Qty: 20 TABLET | Refills: 0 | Status: SHIPPED | OUTPATIENT
Start: 2023-09-08 | End: 2024-03-05 | Stop reason: ALTCHOICE

## 2023-09-08 RX ORDER — MUPIROCIN 20 MG/G
1 OINTMENT TOPICAL
Status: COMPLETED | OUTPATIENT
Start: 2023-09-08 | End: 2023-09-08

## 2023-09-08 RX ORDER — SULFAMETHOXAZOLE AND TRIMETHOPRIM 800; 160 MG/1; MG/1
1 TABLET ORAL
Status: COMPLETED | OUTPATIENT
Start: 2023-09-08 | End: 2023-09-08

## 2023-09-08 RX ORDER — SULFAMETHOXAZOLE AND TRIMETHOPRIM 800; 160 MG/1; MG/1
1 TABLET ORAL 2 TIMES DAILY
Qty: 14 TABLET | Refills: 0 | Status: SHIPPED | OUTPATIENT
Start: 2023-09-08 | End: 2023-09-15

## 2023-09-08 RX ADMIN — SULFAMETHOXAZOLE AND TRIMETHOPRIM 1 TABLET: 800; 160 TABLET ORAL at 07:09

## 2023-09-08 RX ADMIN — LIDOCAINE HYDROCHLORIDE 100 MG: 10 INJECTION, SOLUTION EPIDURAL; INFILTRATION; INTRACAUDAL; PERINEURAL at 07:09

## 2023-09-08 RX ADMIN — MUPIROCIN 22 G: 20 OINTMENT TOPICAL at 07:09

## 2023-09-08 NOTE — ED PROVIDER NOTES
"Encounter Date: 9/8/2023       History     Chief Complaint   Patient presents with    Skin Problem     Pt reports "bump" to right side of face onset 2 or 3 days ago. Denies fever or drainage.      Jostin Lim Jr. is a 43 y.o. male with PMH of asthma, GERD, chronic knee pain, insomnia who presents to the ED for evaluation of facial abscess. 2-3 day ho progressive worsening of R facial abscess. Increased pain, redness and swelling. Pain is throbbing, constant, currently 7/10 in severity.  No fever or drainage.  He does report previous history of skin abscess.     The history is provided by the patient.     Review of patient's allergies indicates:   Allergen Reactions    Percocet [oxycodone-acetaminophen] Hallucinations    Maxalt [rizatriptan] Other (See Comments)     Past Medical History:   Diagnosis Date    Addiction to drug     marijuana 1-2 times daily    Asthma     Back pain     Carpal tunnel syndrome     GERD (gastroesophageal reflux disease)     Hand fracture     Insomnia     Knee pain     Seizures     2010    Sleep apnea     Substance abuse     marijuana     Past Surgical History:   Procedure Laterality Date    CARPAL TUNNEL RELEASE Right     CLOSED REDUCTION OF FRACTURE OF HAND WITH PERCUTANEOUS PINNING Right 1/23/2020    Procedure: CLOSED REDUCTION, FRACTURE, HAND, WITH PERCUTANEOUS PINNING;  Surgeon: Buck Velez MD;  Location: [facility];  Service: Orthopedics;  Laterality: Right;  4th and 5th [facility] DISLOCATION    ESOPHAGOGASTRODUODENOSCOPY N/A 8/3/2018    Procedure: EGD (ESOPHAGOGASTRODUODENOSCOPY);  Surgeon: Cl Soto MD;  Location: [facility];  Service: Endoscopy;  Laterality: N/A;    HERNIA REPAIR      INCISION AND DRAINAGE OF ABSCESS Left 3/15/2022    Procedure: INCISION AND DRAINAGE, ABSCESS (PERINEUM);  Surgeon: Boogie Pleitez Jr., MD;  Location: [facility];  Service: General;  Laterality: Left;    VASECTOMY       Family History   Problem Relation Age of Onset    Alcohol abuse " Mother     No Known Problems Father     No Known Problems Sister     No Known Problems Sister     No Known Problems Brother      Social History     Tobacco Use    Smoking status: Former     Current packs/day: 0.00     Average packs/day: 0.5 packs/day for 22.3 years (11.1 ttl pk-yrs)     Types: Cigarettes     Start date:      Quit date: 2010     Years since quittin.4    Smokeless tobacco: Never    Tobacco comments:     pt currently denies   Substance Use Topics    Alcohol use: Yes     Alcohol/week: 0.0 standard drinks of alcohol     Comment: occassionally    Drug use: Yes     Frequency: 14.0 times per week     Types: Marijuana     Comment: 1-2 daily     Review of Systems   Constitutional:  Negative for activity change, fatigue and fever.   HENT:  Negative for congestion, rhinorrhea and sore throat.    Respiratory:  Negative for cough, chest tightness and shortness of breath.    Cardiovascular:  Negative for chest pain.   Gastrointestinal:  Negative for abdominal pain, diarrhea, nausea and vomiting.   Genitourinary:  Negative for dysuria.   Musculoskeletal:  Negative for back pain.   Skin:  Positive for wound (R facial abscess).   Neurological:  Negative for dizziness and weakness.       Physical Exam     Initial Vitals [23 1852]   BP Pulse Resp Temp SpO2   109/70 70 16 97.7 °F (36.5 °C) 98 %      MAP       --         Physical Exam    Nursing note and vitals reviewed.  Constitutional: He appears well-developed and well-nourished.   HENT:   Head: Normocephalic and atraumatic.   Eyes: Conjunctivae and EOM are normal. Pupils are equal, round, and reactive to light.   Neck: Neck supple.   Cardiovascular:  Normal rate, regular rhythm, normal heart sounds and intact distal pulses.           Pulmonary/Chest: Breath sounds normal.   Abdominal: Abdomen is soft. Bowel sounds are normal.   Musculoskeletal:         General: Normal range of motion.      Cervical back: Neck supple.     Neurological: He is alert  and oriented to person, place, and time. He has normal strength.   Skin: Skin is warm and dry. Abscess (R facial abscess (2 X 1 cm)) noted.   Psychiatric: He has a normal mood and affect. His behavior is normal. Judgment and thought content normal.         ED Course   I & D - Incision and Drainage    Date/Time: 9/8/2023 7:27 PM  Location procedure was performed: [facility] EMERGENCY DEPARTMENT    Performed by: Margot Kennedy NP  Authorized by: Cristofer Terrell MD  Assisting provider: Cristofer Terrell MD  Pre-operative diagnosis: R facial abscess  Post-operative diagnosis: same  Consent Done: Yes  Consent: Verbal consent obtained.  Risks and benefits: risks, benefits and alternatives were discussed  Consent given by: patient  Patient understanding: patient states understanding of the procedure being performed  Type: abscess  Body area: head/neck  Location details: face  Anesthesia: local infiltration    Anesthesia:  Local Anesthetic: lidocaine 1% without epinephrine  Anesthetic total: 5 mL  Description of findings: 2x1 cm R facial abscess   Scalpel size: 11  Incision type: single straight  Incision depth: subcutaneous  Complexity: simple  Drainage: purulent  Drainage amount: moderate  Wound treatment: incision, drainage, deloculation and expression of material  Specimens: Yes (wound culture)  Patient tolerance: Patient tolerated the procedure well with no immediate complications    Incision depth: subcutaneous        Labs Reviewed   CULTURE, AEROBIC  (SPECIFY SOURCE)          Imaging Results    None          Medications   LIDOcaine (PF) 10 mg/ml (1%) injection 100 mg (100 mg Infiltration Given 9/8/23 1902)   mupirocin 2 % ointment 22 g (22 g Topical (Top) Given 9/8/23 1903)   sulfamethoxazole-trimethoprim 800-160mg per tablet 1 tablet (1 tablet Oral Given 9/8/23 1903)     Medical Decision Making  Evaluation of a 43-year-old male with a right facial abscess.  Patient presents with a 2 x 1 cm R facial abscess next to  mouth with + fluctuance.     Differential diagnosis includes folliculitis, abscess, cellulitis    Problems Addressed:  Facial abscess: acute illness or injury     Details: Incision and drainage.   Bactrim DS and bactroban ointment  Wound cx pending     Risk  Prescription drug management.  Risk Details: Stable for DC home. Patient/caregiver voices understanding and feels comfortable with discharge plan.      The patient acknowledges that close follow up with medical provider is required. Instructed to follow up with PCP within 2 days. Patient was given specific return precautions. The patient agrees to comply with all instruction and directions given in the ER.                                 Clinical Impression:   Final diagnoses:  [Z76.89] Encounter for incision and drainage procedure (Primary)  [L02.01] Facial abscess        ED Disposition Condition    Discharge Stable          ED Prescriptions       Medication Sig Dispense Start Date End Date Auth. Provider    sulfamethoxazole-trimethoprim 800-160mg (BACTRIM DS) 800-160 mg Tab Take 1 tablet by mouth 2 (two) times daily. for 7 days 14 tablet 9/8/2023 9/15/2023 Margot Kennedy NP    mupirocin (BACTROBAN) 2 % ointment Apply topically 3 (three) times daily. 15 g 9/8/2023 -- Margot Kennedy NP    ibuprofen (ADVIL,MOTRIN) 800 MG tablet Take 1 tablet (800 mg total) by mouth every 8 (eight) hours as needed for Pain. 20 tablet 9/8/2023 -- Margot Kennedy NP          Follow-up Information       Follow up With Specialties Details Why Contact Info    Arvin Ramires MD Internal Medicine Schedule an appointment as soon as possible for a visit in 2 days  [address] [facility] [address]  [phone]               Margot Kennedy NP  09/08/23 4036

## 2023-09-09 NOTE — ED TRIAGE NOTES
"43 y.o. male presents to ER ED 01/ED 01A   Chief Complaint   Patient presents with    Skin Problem     Pt reports "bump" to right side of face onset 2 or 3 days ago. Denies fever or drainage.      "

## 2023-09-11 LAB — BACTERIA SPEC AEROBE CULT: NORMAL

## 2024-04-18 PROBLEM — Z01.818 PRE-OP EVALUATION: Status: ACTIVE | Noted: 2024-04-18

## 2024-12-11 ENCOUNTER — HOSPITAL ENCOUNTER (EMERGENCY)
Facility: HOSPITAL | Age: 44
Discharge: HOME | End: 2024-12-11
Attending: FAMILY MEDICINE
Payer: MEDICARE

## 2024-12-11 VITALS
TEMPERATURE: 99 F | HEART RATE: 87 BPM | DIASTOLIC BLOOD PRESSURE: 69 MMHG | RESPIRATION RATE: 18 BRPM | OXYGEN SATURATION: 97 % | BODY MASS INDEX: 24.81 KG/M2 | SYSTOLIC BLOOD PRESSURE: 119 MMHG | WEIGHT: 177.94 LBS

## 2024-12-11 DIAGNOSIS — L02.91 ABSCESS: Primary | ICD-10-CM

## 2024-12-11 PROCEDURE — 63600175 PHARM REV CODE 636 W HCPCS: Performed by: NURSE PRACTITIONER

## 2024-12-11 PROCEDURE — 99283 EMERGENCY DEPT VISIT LOW MDM: CPT

## 2024-12-11 PROCEDURE — 10060 I&D ABSCESS SIMPLE/SINGLE: CPT

## 2024-12-11 RX ORDER — LIDOCAINE HYDROCHLORIDE 10 MG/ML
5 INJECTION, SOLUTION EPIDURAL; INFILTRATION; INTRACAUDAL; PERINEURAL
Status: COMPLETED | OUTPATIENT
Start: 2024-12-11 | End: 2024-12-11

## 2024-12-11 RX ORDER — SULFAMETHOXAZOLE AND TRIMETHOPRIM 800; 160 MG/1; MG/1
1 TABLET ORAL 2 TIMES DAILY
Qty: 14 TABLET | Refills: 0 | Status: SHIPPED | OUTPATIENT
Start: 2024-12-11 | End: 2024-12-18

## 2024-12-11 RX ADMIN — LIDOCAINE HYDROCHLORIDE 50 MG: 10 INJECTION, SOLUTION EPIDURAL; INFILTRATION; INTRACAUDAL at 03:12

## 2024-12-11 NOTE — ED TRIAGE NOTES
44 y.o. male presents to ER Room/bed info not found   Chief Complaint   Patient presents with    Wound Check   .   Possible abscess to abd for 3-4 days

## 2024-12-11 NOTE — ED PROVIDER NOTES
Encounter Date: 12/11/2024       History     Chief Complaint   Patient presents with    Wound Check     Chief complaint: Abscess   44-year-old male history of substance abuse asthma back pain GERD carpal tunnel seizures knee pain and some may presents to be evaluated for an abscess to his lower abdomen he has had for the last 3 days.  Denies fever body aches or chills.  Reports he has not had any wounds they are previously.      Review of patient's allergies indicates:   Allergen Reactions    Coconut Anaphylaxis    Maxalt [rizatriptan] Other (See Comments)     Past Medical History:   Diagnosis Date    Addiction to drug     marijuana 1-2 times daily    Asthma     Back pain     Carpal tunnel syndrome     GERD (gastroesophageal reflux disease)     Hand fracture     Insomnia     Knee pain     Seizures     2010    Sleep apnea     Substance abuse     marijuana     Past Surgical History:   Procedure Laterality Date    CARPAL TUNNEL RELEASE Right     CLOSED REDUCTION OF FRACTURE OF HAND WITH PERCUTANEOUS PINNING Right 1/23/2020    Procedure: CLOSED REDUCTION, FRACTURE, HAND, WITH PERCUTANEOUS PINNING;  Surgeon: Buck Velez MD;  Location: [facility];  Service: Orthopedics;  Laterality: Right;  4th and 5th [facility] DISLOCATION    ESOPHAGOGASTRODUODENOSCOPY N/A 8/3/2018    Procedure: EGD (ESOPHAGOGASTRODUODENOSCOPY);  Surgeon: Cl Soto MD;  Location: [facility];  Service: Endoscopy;  Laterality: N/A;    HERNIA REPAIR      INCISION AND DRAINAGE OF ABSCESS Left 3/15/2022    Procedure: INCISION AND DRAINAGE, ABSCESS (PERINEUM);  Surgeon: Boogie Pleitez Jr., MD;  Location: [facility];  Service: General;  Laterality: Left;    VASECTOMY       Family History   Problem Relation Name Age of Onset    Alcohol abuse Mother Phyliss     No Known Problems Father      No Known Problems Sister Chanel     No Known Problems Sister Margot     No Known Problems Brother Johnny      Social History     Tobacco Use    Smoking status: Former      Current packs/day: 0.00     Average packs/day: 0.5 packs/day for 22.3 years (11.1 ttl pk-yrs)     Types: Cigarettes     Start date:      Quit date: 2010     Years since quittin.6    Smokeless tobacco: Never    Tobacco comments:     pt currently denies   Substance Use Topics    Alcohol use: Yes     Alcohol/week: 0.0 standard drinks of alcohol     Comment: occassionally    Drug use: Yes     Frequency: 14.0 times per week     Types: Marijuana     Comment: 1-2 daily     Review of Systems   Constitutional:  Negative for fatigue and fever.   Gastrointestinal:  Negative for abdominal pain.   Musculoskeletal:  Negative for arthralgias and myalgias.   Skin:  Positive for color change and wound.       Physical Exam     Initial Vitals [24 1522]   BP Pulse Resp Temp SpO2   119/69 87 18 98.6 °F (37 °C) 97 %      MAP       --         Physical Exam    Nursing note and vitals reviewed.  Constitutional: He appears well-developed and well-nourished.   Cardiovascular:  Normal rate.           Musculoskeletal:         General: Normal range of motion.     Neurological: He is alert and oriented to person, place, and time. He has normal strength.   Skin: Skin is warm. Abscess noted.   2 cm suprapubic abscess         ED Course   I & D - Incision and Drainage    Date/Time: 2024 4:13 PM  Location procedure was performed: [facility] EMERGENCY DEPARTMENT    Performed by: Montserrat Antoine NP  Authorized by: Haylee Gray MD  Type: abscess  Body area: trunk  Location details: abdomen    Anesthesia:  Local Anesthetic: lidocaine 1% without epinephrine  Anesthetic total: 3 mL  Scalpel size: 11  Incision type: single straight  Complexity: simple  Drainage: serosanguinous  Drainage amount: moderate  Wound treatment: incision, deloculation and expression of material  Patient tolerance: Patient tolerated the procedure well with no immediate complications        Labs Reviewed - No data to display       Imaging Results     None          Medications   LIDOcaine (PF) 10 mg/ml (1%) injection 50 mg (50 mg Infiltration Given by Other 12/11/24 1288)     Medical Decision Making  44 year male presents to be evaluated for an abscess to his lower abdomen he has had for the last 3 days   Differential diagnosis include abscess, cellulitis, wound    Risk  Prescription drug management.  Risk Details: Patient with a 2 cm abscess to his lower abdomen   I&D performed with good expression of purulent material   Stable for DC with follow-up   Given return precautions                                      Clinical Impression:  Final diagnoses:  [L02.91] Abscess (Primary)          ED Disposition Condition    Discharge Stable          ED Prescriptions       Medication Sig Dispense Start Date End Date Auth. Provider    sulfamethoxazole-trimethoprim 800-160mg (BACTRIM DS) 800-160 mg Tab Take 1 tablet by mouth 2 (two) times daily. for 7 days 14 tablet 12/11/2024 12/18/2024 Montserrat Antoine NP          Follow-up Information    None          Montserrat Antoine NP  12/11/24 7268

## 2024-12-11 NOTE — DISCHARGE INSTRUCTIONS
Keep area clean and dry   Take antibiotics as directed   Please follow-up with primary care doctor

## 2025-03-17 ENCOUNTER — PATIENT MESSAGE (OUTPATIENT)
Dept: ADMINISTRATIVE | Facility: HOSPITAL | Age: 45
End: 2025-03-17
Payer: MEDICARE

## 2025-04-02 VITALS
HEIGHT: 71 IN | WEIGHT: 174.25 LBS | HEART RATE: 83 BPM | OXYGEN SATURATION: 97 % | DIASTOLIC BLOOD PRESSURE: 61 MMHG | SYSTOLIC BLOOD PRESSURE: 102 MMHG | TEMPERATURE: 98 F | BODY MASS INDEX: 24.4 KG/M2 | RESPIRATION RATE: 16 BRPM

## 2025-04-02 PROCEDURE — 99284 EMERGENCY DEPT VISIT MOD MDM: CPT | Mod: 25

## 2025-04-03 ENCOUNTER — HOSPITAL ENCOUNTER (EMERGENCY)
Facility: HOSPITAL | Age: 45
Discharge: HOME | End: 2025-04-03
Attending: SURGERY
Payer: MEDICARE

## 2025-04-03 DIAGNOSIS — L02.31 ABSCESS OF RIGHT BUTTOCK: Primary | ICD-10-CM

## 2025-04-03 PROCEDURE — 25000003 PHARM REV CODE 250: Performed by: SURGERY

## 2025-04-03 PROCEDURE — 10080 I&D PILONIDAL CYST SIMPLE: CPT

## 2025-04-03 PROCEDURE — 87070 CULTURE OTHR SPECIMN AEROBIC: CPT | Performed by: SURGERY

## 2025-04-03 PROCEDURE — 63600175 PHARM REV CODE 636 W HCPCS: Performed by: SURGERY

## 2025-04-03 RX ORDER — LIDOCAINE HYDROCHLORIDE 10 MG/ML
10 INJECTION, SOLUTION EPIDURAL; INFILTRATION; INTRACAUDAL; PERINEURAL
Status: COMPLETED | OUTPATIENT
Start: 2025-04-03 | End: 2025-04-03

## 2025-04-03 RX ORDER — SULFAMETHOXAZOLE AND TRIMETHOPRIM 800; 160 MG/1; MG/1
1 TABLET ORAL 2 TIMES DAILY
Qty: 14 TABLET | Refills: 0 | Status: SHIPPED | OUTPATIENT
Start: 2025-04-03 | End: 2025-04-03

## 2025-04-03 RX ORDER — SULFAMETHOXAZOLE AND TRIMETHOPRIM 800; 160 MG/1; MG/1
1 TABLET ORAL 2 TIMES DAILY
Qty: 14 TABLET | Refills: 0 | Status: SHIPPED | OUTPATIENT
Start: 2025-04-03 | End: 2025-04-10

## 2025-04-03 RX ORDER — HYDROCODONE BITARTRATE AND ACETAMINOPHEN 5; 325 MG/1; MG/1
1 TABLET ORAL EVERY 4 HOURS PRN
Qty: 15 TABLET | Refills: 0 | Status: SHIPPED | OUTPATIENT
Start: 2025-04-03 | End: 2025-04-03

## 2025-04-03 RX ORDER — MUPIROCIN 20 MG/G
OINTMENT TOPICAL
Status: COMPLETED | OUTPATIENT
Start: 2025-04-03 | End: 2025-04-03

## 2025-04-03 RX ORDER — HYDROCODONE BITARTRATE AND ACETAMINOPHEN 5; 325 MG/1; MG/1
1 TABLET ORAL EVERY 4 HOURS PRN
Qty: 15 TABLET | Refills: 0 | Status: SHIPPED | OUTPATIENT
Start: 2025-04-03 | End: 2025-04-06

## 2025-04-03 RX ORDER — MUPIROCIN 20 MG/G
OINTMENT TOPICAL 3 TIMES DAILY
Qty: 15 G | Refills: 0 | Status: SHIPPED | OUTPATIENT
Start: 2025-04-03 | End: 2025-04-13

## 2025-04-03 RX ORDER — MUPIROCIN 20 MG/G
OINTMENT TOPICAL 3 TIMES DAILY
Qty: 15 G | Refills: 0 | Status: SHIPPED | OUTPATIENT
Start: 2025-04-03 | End: 2025-04-03

## 2025-04-03 RX ADMIN — MUPIROCIN: 20 OINTMENT TOPICAL at 12:04

## 2025-04-03 RX ADMIN — LIDOCAINE HYDROCHLORIDE 100 MG: 10 INJECTION, SOLUTION EPIDURAL; INFILTRATION; INTRACAUDAL; PERINEURAL at 12:04

## 2025-04-03 NOTE — ED PROVIDER NOTES
Encounter Date: 4/2/2025       History     Chief Complaint   Patient presents with    Abscess     Pt to ED with c/o abscess to tailbone; hx of pilonidal cyst.      History of Present Illness  Jostin Lim Jr. is a 44 y.o. male that presents with an abscess  Patient gets recurrent abscesses, recurrent buttock abscess issues on HX  Patient has a 3 x 2 centimeter right buttock abscess, not perirectal abscess  This is not a pilonidal abscess, it is in the mid right buttock area on exam  No signs of cellulitic spread, agrees to incision & drainage in the ER tonight    The history is provided by the patient and the spouse.     Review of patient's allergies indicates:   Allergen Reactions    Coconut Anaphylaxis    Maxalt [rizatriptan] Other (See Comments)     Past Medical History:   Diagnosis Date    Addiction to drug     marijuana 1-2 times daily    Asthma     Back pain     Carpal tunnel syndrome     GERD (gastroesophageal reflux disease)     Hand fracture     Insomnia     Knee pain     Seizures     2010    Sleep apnea     Substance abuse     marijuana     Past Surgical History:   Procedure Laterality Date    CARPAL TUNNEL RELEASE Right     CLOSED REDUCTION OF FRACTURE OF HAND WITH PERCUTANEOUS PINNING Right 1/23/2020    Procedure: CLOSED REDUCTION, FRACTURE, HAND, WITH PERCUTANEOUS PINNING;  Surgeon: Buck Velez MD;  Location: [facility];  Service: Orthopedics;  Laterality: Right;  4th and 5th [facility] DISLOCATION    ESOPHAGOGASTRODUODENOSCOPY N/A 8/3/2018    Procedure: EGD (ESOPHAGOGASTRODUODENOSCOPY);  Surgeon: Cl Soto MD;  Location: [facility];  Service: Endoscopy;  Laterality: N/A;    HERNIA REPAIR      INCISION AND DRAINAGE OF ABSCESS Left 3/15/2022    Procedure: INCISION AND DRAINAGE, ABSCESS (PERINEUM);  Surgeon: Boogie Pleitez Jr., MD;  Location: [facility];  Service: General;  Laterality: Left;    VASECTOMY       Family History   Problem Relation Name Age of Onset    Alcohol abuse Mother  Phyliss     No Known Problems Father      No Known Problems Sister Chanel     No Known Problems Sister Margot     No Known Problems Brother Johnny      Social History[1]    Review of Systems   Constitutional:  Negative for diaphoresis, fatigue and fever.   HENT:  Negative for ear pain, hearing loss and tinnitus.    Eyes:  Negative for pain and redness.   Respiratory:  Negative for cough, shortness of breath and wheezing.    Cardiovascular:  Negative for chest pain.   Gastrointestinal:  Negative for abdominal pain, constipation, diarrhea, nausea and rectal pain.   Musculoskeletal:  Negative for back pain, neck pain and neck stiffness.   Skin:  Positive for wound.   Neurological:  Negative for dizziness, seizures, numbness and headaches.   Psychiatric/Behavioral:  Negative for confusion, decreased concentration and dysphoric mood.    All other systems reviewed and are negative.      Physical Exam     Initial Vitals [04/02/25 2359]   BP Pulse Resp Temp SpO2   102/61 83 16 98.1 °F (36.7 °C) 97 %      MAP       --         Physical Exam    Nursing note and vitals reviewed.  Constitutional: Vital signs are normal. He appears well-developed and well-nourished. He is cooperative.   HENT:   Head: Normocephalic and atraumatic. Mouth/Throat: Uvula is midline and mucous membranes are normal.   Eyes: Conjunctivae, EOM and lids are normal. Pupils are equal, round, and reactive to light.   Neck: Neck supple.   Normal range of motion.   Full passive range of motion without pain.     Cardiovascular:  Normal rate, regular rhythm, S1 normal, S2 normal, normal heart sounds, intact distal pulses and normal pulses.           Pulmonary/Chest: Effort normal and breath sounds normal.   Abdominal: Abdomen is soft and flat. Bowel sounds are normal.   Musculoskeletal:         General: Normal range of motion.      Cervical back: Full passive range of motion without pain, normal range of motion and neck supple.     Neurological: He is alert and  oriented to person, place, and time. He has normal strength.   Skin: Skin is intact. Capillary refill takes less than 2 seconds.   (+) 3 x 2 centimeter mid right buttock abscess, no perirectal component         ED Course   I & D - Incision and Drainage    Date/Time: 4/3/2025 12:32 AM  Location procedure was performed: [facility] EMERGENCY DEPARTMENT    Performed by: Cristofer Terrell MD  Authorized by: Cristofer Terrell MD  Consent Done: Emergent Situation  Type: abscess  Body area: anogenital  Location details: gluteal cleft  Anesthesia: local infiltration    Anesthesia:  Local Anesthetic: lidocaine 1% without epinephrine  Anesthetic total: 10 mL    Patient sedated: no  Description of findings: 3 x 2 centimeter right buttock abscess   Scalpel size: 11  Incision type: single straight  Incision depth: dermal  Complexity: simple  Drainage: pus  Drainage amount: moderate  Wound treatment: drainage and incision  Packing material: 1/4 in gauze  Technical procedures used: Incision & drainage  Significant surgical tasks conducted by the assistant(s): Incision & drainage  Complications: No  Estimated blood loss (mL): 0  Specimens: No  Implants: No  Patient tolerance: Patient tolerated the procedure well with no immediate complications    Incision depth: dermal        Labs Reviewed   CULTURE, AEROBIC  (SPECIFY SOURCE)          Imaging Results    None          Medications   LIDOcaine (PF) 10 mg/ml (1%) injection 100 mg (100 mg Infiltration Given 4/3/25 0015)   mupirocin 2 % ointment ( Topical (Top) Given 4/3/25 0015)     Medical Decision Making  Differential includes abscess, cellulitis, carbuncle, furuncle    Problems Addressed:  Abscess of right buttock: complicated acute illness or injury    ED Management & Risks of Complication, Morbidity, & Mortality:  1% lidocaine for local anesthesia  Incision & drainage with pus expressed  Pus sent for culture  Wound irrigated & drained, packed with sterile gauze  Packing removal in 3 days  as an outpatient on NC  Antibiotics prescribed, clean 3 times a day with Bactroban after  Norco for pain, return with any concerns on discharge    Clinical Impression:  Final diagnoses:  [L02.31] Abscess of right buttock (Primary)          ED Disposition Condition    Discharge Stable          ED Prescriptions       Medication Sig Dispense Start Date End Date Auth. Provider    HYDROcodone-acetaminophen (NORCO) 5-325 mg per tablet  (Status: Discontinued) Take 1 tablet by mouth every 4 (four) hours as needed for Pain. 15 tablet 4/3/2025 4/3/2025 Cristofer Terrell MD    mupirocin (BACTROBAN) 2 % ointment  (Status: Discontinued) Apply topically 3 (three) times daily. for 10 days 15 g 4/3/2025 4/3/2025 Cristofer Terrell MD    sulfamethoxazole-trimethoprim 800-160mg (BACTRIM DS) 800-160 mg Tab  (Status: Discontinued) Take 1 tablet by mouth 2 (two) times daily. for 7 days 14 tablet 4/3/2025 4/3/2025 Cristofer Terrell MD    HYDROcodone-acetaminophen (NORCO) 5-325 mg per tablet Take 1 tablet by mouth every 4 (four) hours as needed for Pain. 15 tablet 4/3/2025 2025 Cristofer Terrell MD    mupirocin (BACTROBAN) 2 % ointment Apply topically 3 (three) times daily. for 10 days 15 g 4/3/2025 2025 Cristofer Terrell MD    sulfamethoxazole-trimethoprim 800-160mg (BACTRIM DS) 800-160 mg Tab Take 1 tablet by mouth 2 (two) times daily. for 7 days 14 tablet 4/3/2025 4/10/2025 Cristofer Terrell MD          Follow-up Information       Follow up With Specialties Details Why Contact Info    Arvin Ramires MD Internal Medicine Schedule an appointment as soon as possible for a visit in 2 days   [facility] [place] [address]  [phone]                 [1]   Social History  Tobacco Use    Smoking status: Former     Current packs/day: 0.00     Average packs/day: 0.5 packs/day for 22.3 years (11.1 ttl pk-yrs)     Types: Cigarettes     Start date:      Quit date: 2010     Years since quittin.9    Smokeless tobacco:  Never   Substance Use Topics    Alcohol use: Yes     Alcohol/week: 0.0 standard drinks of alcohol     Comment: occassionally    Drug use: Yes     Frequency: 14.0 times per week     Types: Marijuana     Comment: 1-2 daily        Cristofer Terrell MD  04/03/25 0035

## 2025-04-05 ENCOUNTER — HOSPITAL ENCOUNTER (EMERGENCY)
Facility: HOSPITAL | Age: 45
Discharge: HOME | End: 2025-04-05
Attending: FAMILY MEDICINE
Payer: MEDICARE

## 2025-04-05 VITALS
OXYGEN SATURATION: 100 % | RESPIRATION RATE: 18 BRPM | SYSTOLIC BLOOD PRESSURE: 110 MMHG | HEART RATE: 68 BPM | TEMPERATURE: 98 F | HEIGHT: 71 IN | WEIGHT: 175.94 LBS | DIASTOLIC BLOOD PRESSURE: 69 MMHG | BODY MASS INDEX: 24.63 KG/M2

## 2025-04-05 DIAGNOSIS — L03.317 CELLULITIS AND ABSCESS OF BUTTOCK: ICD-10-CM

## 2025-04-05 DIAGNOSIS — L02.31 CELLULITIS AND ABSCESS OF BUTTOCK: ICD-10-CM

## 2025-04-05 DIAGNOSIS — Z48.00 ABSCESS PACKING REMOVAL: Primary | ICD-10-CM

## 2025-04-05 DIAGNOSIS — Z51.89 ENCOUNTER FOR WOUND RE-CHECK: ICD-10-CM

## 2025-04-05 LAB — BACTERIA SPEC AEROBE CULT: ABNORMAL

## 2025-04-05 PROCEDURE — 63600175 PHARM REV CODE 636 W HCPCS: Performed by: NURSE PRACTITIONER

## 2025-04-05 PROCEDURE — 96372 THER/PROPH/DIAG INJ SC/IM: CPT | Performed by: NURSE PRACTITIONER

## 2025-04-05 PROCEDURE — 99284 EMERGENCY DEPT VISIT MOD MDM: CPT | Mod: 25

## 2025-04-05 RX ORDER — ONDANSETRON HYDROCHLORIDE 2 MG/ML
4 INJECTION, SOLUTION INTRAVENOUS
Status: COMPLETED | OUTPATIENT
Start: 2025-04-05 | End: 2025-04-05

## 2025-04-05 RX ORDER — MORPHINE SULFATE 2 MG/ML
4 INJECTION, SOLUTION INTRAMUSCULAR; INTRAVENOUS
Status: COMPLETED | OUTPATIENT
Start: 2025-04-05 | End: 2025-04-05

## 2025-04-05 RX ADMIN — MORPHINE SULFATE 4 MG: 2 INJECTION, SOLUTION INTRAMUSCULAR; INTRAVENOUS at 12:04

## 2025-04-05 RX ADMIN — ONDANSETRON 4 MG: 2 INJECTION INTRAMUSCULAR; INTRAVENOUS at 12:04

## 2025-04-05 NOTE — ED TRIAGE NOTES
Pt arrived to ED c/o needing to get packing removed from wound (cyst) on buttocks. Pt rates pain 7/10

## 2025-04-05 NOTE — Clinical Note
"Jostin Delgadopramod Lim was seen and treated in our emergency department on 4/5/2025.  He may return to work on 04/12/2025.       If you have any questions or concerns, please don't hesitate to call.      MIGUEL herbert RN    "

## 2025-04-05 NOTE — ED PROVIDER NOTES
Encounter Date: 4/5/2025       History     Chief Complaint   Patient presents with    Wound Check     Pt arrived to ED c/o needing to get packing removed from wound (cyst) on buttocks. Pt rates pain 7/10     Jostin Lim Jr. is a 44 y.o. male with PMH of asthma, GERD, insomnia, sleep apnea presenting to the ED for evaluation of wound check.  Patient is S/P incision and drainage of right buttocks abscess on 04/03/2025.  He presents for packing removal.  He notes that pain is moderate, currently rated 8/10 in severity in his exacerbated with sitting.  He denies fever or increased drainage from wound. He notes that he has not filled Rx Bactrim or Norco due to cost of medication.     The history is provided by the patient.     Review of patient's allergies indicates:   Allergen Reactions    Coconut Anaphylaxis    Maxalt [rizatriptan] Other (See Comments)     Past Medical History:   Diagnosis Date    Addiction to drug     marijuana 1-2 times daily    Asthma     Back pain     Carpal tunnel syndrome     GERD (gastroesophageal reflux disease)     Hand fracture     Insomnia     Knee pain     Seizures     2010    Sleep apnea     Substance abuse     marijuana     Past Surgical History:   Procedure Laterality Date    CARPAL TUNNEL RELEASE Right     CLOSED REDUCTION OF FRACTURE OF HAND WITH PERCUTANEOUS PINNING Right 1/23/2020    Procedure: CLOSED REDUCTION, FRACTURE, HAND, WITH PERCUTANEOUS PINNING;  Surgeon: Buck Velez MD;  Location: [facility];  Service: Orthopedics;  Laterality: Right;  4th and 5th [facility] DISLOCATION    ESOPHAGOGASTRODUODENOSCOPY N/A 8/3/2018    Procedure: EGD (ESOPHAGOGASTRODUODENOSCOPY);  Surgeon: Cl Soto MD;  Location: [facility];  Service: Endoscopy;  Laterality: N/A;    HERNIA REPAIR      INCISION AND DRAINAGE OF ABSCESS Left 3/15/2022    Procedure: INCISION AND DRAINAGE, ABSCESS (PERINEUM);  Surgeon: Boogie Pleitez Jr., MD;  Location: [facility];  Service: General;  Laterality:  Left;    VASECTOMY       Family History   Problem Relation Name Age of Onset    Alcohol abuse Mother Sabrina     No Known Problems Father      No Known Problems Sister Chanel     No Known Problems Sister Margot     No Known Problems Brother Johnny      Social History[1]  Review of Systems   Constitutional:  Negative for appetite change, chills and fever.   HENT:  Negative for congestion, ear discharge, ear pain, postnasal drip, rhinorrhea and sore throat.    Respiratory:  Negative for cough, chest tightness and shortness of breath.    Cardiovascular:  Negative for chest pain.   Gastrointestinal:  Negative for abdominal distention, abdominal pain and nausea.   Genitourinary:  Negative for dysuria, flank pain, hematuria and urgency.   Musculoskeletal:  Negative for arthralgias and back pain.   Skin:  Positive for wound (R buttocks abscess). Negative for rash.   Neurological:  Negative for dizziness, weakness, numbness and headaches.   Hematological:  Does not bruise/bleed easily.       Physical Exam     Initial Vitals [04/05/25 1150]   BP Pulse Resp Temp SpO2   107/64 89 18 98.1 °F (36.7 °C) 97 %      MAP       --         Physical Exam    Nursing note and vitals reviewed.  Constitutional: He appears well-developed and well-nourished.   HENT:   Head: Normocephalic and atraumatic.   Eyes: Conjunctivae and EOM are normal. Pupils are equal, round, and reactive to light.   Neck: Neck supple.   Cardiovascular:  Normal rate, regular rhythm, normal heart sounds and intact distal pulses.           Pulmonary/Chest: Breath sounds normal.   Abdominal: Abdomen is soft. Bowel sounds are normal.   Musculoskeletal:         General: Normal range of motion.      Cervical back: Neck supple.     Neurological: He is alert and oriented to person, place, and time. He has normal strength.   Skin: Skin is warm and dry. Abscess (R buttocks incision with packing with mild surrounidng induration. No active draiange.) noted.   Psychiatric: He has a  normal mood and affect. His behavior is normal. Judgment and thought content normal.         ED Course   Procedures  Labs Reviewed - No data to display       Imaging Results    None          Medications   morphine injection 4 mg (4 mg Intramuscular Given 4/5/25 1212)   ondansetron injection 4 mg (4 mg Intramuscular Given 4/5/25 1211)     Medical Decision Making  Evaluation of a 44-year-old male presenting for abscess packing removal  Presents afebrile, nontoxic appearing with stable vital signs  Physical exam with right buttocks incision with packing with mild, surrounding induration.  No active drainage or cellulitic spread.    Differential diagnosis includes wound check, abscess     Risk  Prescription drug management.  Risk Details: Stable for discharge home.  Patient given morphine for pain control prior to packing removal and repacking. While attempting to repack wound, procedure aborted per patient request. He reports that he cannot tolerate packing and would prefer to leave wound open. I placed a 4/4 gauze over incision. Instructed to fill previously RX Bactrim and Norco. Return to the ED in 02 days for wound check.     I have placed a referral to general surgery for follow-up.     Patient/caregiver voices understanding and feels comfortable with discharge plan.      The patient acknowledges that close follow up with medical provider is required. Instructed to follow up with PCP within 2 days. Patient was given specific return precautions. The patient agrees to comply with all instruction and directions given in the ER.                                        Clinical Impression:  Final diagnoses:  [Z48.00] Abscess packing removal (Primary)  [Z51.89] Encounter for wound re-check  [L02.31, L03.317] Cellulitis and abscess of buttock          ED Disposition Condition    Discharge Stable          ED Prescriptions    None       Follow-up Information       Follow up With Specialties Details Why Contact Info     Arvin Ramires MD Internal Medicine Schedule an appointment as soon as possible for a visit in 2 days   [address]  [place] [address]  [phone]                   [1]   Social History  Tobacco Use    Smoking status: Former     Current packs/day: 0.00     Average packs/day: 0.5 packs/day for 22.3 years (11.1 ttl pk-yrs)     Types: Cigarettes     Start date:      Quit date: 2010     Years since quittin.9    Smokeless tobacco: Never   Substance Use Topics    Alcohol use: Yes     Alcohol/week: 0.0 standard drinks of alcohol     Comment: occassionally    Drug use: Yes     Frequency: 14.0 times per week     Types: Marijuana     Comment: 1-2 daily        Margot Kennedy NP  [phone]

## 2025-04-05 NOTE — ED NOTES
Np at the bedside placed bandage over wound covered and patient instructed to f/u with general sx. Patient voiced understanding

## 2025-07-09 ENCOUNTER — HOSPITAL ENCOUNTER (EMERGENCY)
Facility: HOSPITAL | Age: 45
Discharge: HOME | End: 2025-07-09
Attending: STUDENT IN AN ORGANIZED HEALTH CARE EDUCATION/TRAINING PROGRAM
Payer: MEDICARE

## 2025-07-09 VITALS
SYSTOLIC BLOOD PRESSURE: 113 MMHG | DIASTOLIC BLOOD PRESSURE: 73 MMHG | OXYGEN SATURATION: 97 % | HEART RATE: 61 BPM | RESPIRATION RATE: 19 BRPM | TEMPERATURE: 98 F | WEIGHT: 171.31 LBS | BODY MASS INDEX: 23.89 KG/M2

## 2025-07-09 DIAGNOSIS — L02.91 ABSCESS: Primary | ICD-10-CM

## 2025-07-09 PROCEDURE — 63600175 PHARM REV CODE 636 W HCPCS: Performed by: STUDENT IN AN ORGANIZED HEALTH CARE EDUCATION/TRAINING PROGRAM

## 2025-07-09 PROCEDURE — 25000003 PHARM REV CODE 250: Performed by: STUDENT IN AN ORGANIZED HEALTH CARE EDUCATION/TRAINING PROGRAM

## 2025-07-09 PROCEDURE — 99284 EMERGENCY DEPT VISIT MOD MDM: CPT | Mod: 25

## 2025-07-09 PROCEDURE — 10060 I&D ABSCESS SIMPLE/SINGLE: CPT

## 2025-07-09 PROCEDURE — 96372 THER/PROPH/DIAG INJ SC/IM: CPT | Performed by: STUDENT IN AN ORGANIZED HEALTH CARE EDUCATION/TRAINING PROGRAM

## 2025-07-09 RX ORDER — ACETAMINOPHEN 500 MG
500 TABLET ORAL EVERY 6 HOURS PRN
Qty: 30 TABLET | Refills: 0 | Status: SHIPPED | OUTPATIENT
Start: 2025-07-09

## 2025-07-09 RX ORDER — KETOROLAC TROMETHAMINE 30 MG/ML
15 INJECTION, SOLUTION INTRAMUSCULAR; INTRAVENOUS
Status: COMPLETED | OUTPATIENT
Start: 2025-07-09 | End: 2025-07-09

## 2025-07-09 RX ORDER — SULFAMETHOXAZOLE AND TRIMETHOPRIM 800; 160 MG/1; MG/1
1 TABLET ORAL
Status: COMPLETED | OUTPATIENT
Start: 2025-07-09 | End: 2025-07-09

## 2025-07-09 RX ORDER — IBUPROFEN 600 MG/1
600 TABLET, FILM COATED ORAL EVERY 6 HOURS PRN
Qty: 20 TABLET | Refills: 0 | Status: SHIPPED | OUTPATIENT
Start: 2025-07-09

## 2025-07-09 RX ORDER — SULFAMETHOXAZOLE AND TRIMETHOPRIM 800; 160 MG/1; MG/1
1 TABLET ORAL 2 TIMES DAILY
Qty: 14 TABLET | Refills: 0 | Status: SHIPPED | OUTPATIENT
Start: 2025-07-09 | End: 2025-07-16

## 2025-07-09 RX ORDER — LIDOCAINE HYDROCHLORIDE 10 MG/ML
5 INJECTION, SOLUTION EPIDURAL; INFILTRATION; INTRACAUDAL; PERINEURAL
Status: COMPLETED | OUTPATIENT
Start: 2025-07-09 | End: 2025-07-09

## 2025-07-09 RX ADMIN — SULFAMETHOXAZOLE AND TRIMETHOPRIM 1 TABLET: 800; 160 TABLET ORAL at 11:07

## 2025-07-09 RX ADMIN — KETOROLAC TROMETHAMINE 15 MG: 30 INJECTION, SOLUTION INTRAMUSCULAR at 11:07

## 2025-07-09 RX ADMIN — LIDOCAINE HYDROCHLORIDE 50 MG: 10 INJECTION, SOLUTION EPIDURAL; INFILTRATION; INTRACAUDAL; PERINEURAL at 10:07

## 2025-07-10 NOTE — ED PROVIDER NOTES
Encounter Date: 7/9/2025       History     Chief Complaint   Patient presents with    Abscess     Pt to ED c/o abscess under left eye. Reports showed up yesterday and is painful and getting bigger. States has had this before and went away with oral abx. Also c/o HA at this time.      45-year-old male with history of asthma presenting with abscess to left cheek.  Patient reports he has had multiple abscesses on his face previously.  Denies any fever.  No vision changes or eye pain.  No other complaints.      Review of patient's allergies indicates:   Allergen Reactions    Coconut Anaphylaxis    Maxalt [rizatriptan] Other (See Comments)     Past Medical History:   Diagnosis Date    Addiction to drug     marijuana 1-2 times daily    Asthma     Back pain     Carpal tunnel syndrome     GERD (gastroesophageal reflux disease)     Hand fracture     Insomnia     Knee pain     Seizures     2010    Sleep apnea     Substance abuse     marijuana     Past Surgical History:   Procedure Laterality Date    CARPAL TUNNEL RELEASE Right     CLOSED REDUCTION OF FRACTURE OF HAND WITH PERCUTANEOUS PINNING Right 1/23/2020    Procedure: CLOSED REDUCTION, FRACTURE, HAND, WITH PERCUTANEOUS PINNING;  Surgeon: Buck Velez MD;  Location: [facility];  Service: Orthopedics;  Laterality: Right;  4th and 5th [facility] DISLOCATION    ESOPHAGOGASTRODUODENOSCOPY N/A 8/3/2018    Procedure: EGD (ESOPHAGOGASTRODUODENOSCOPY);  Surgeon: Cl Soto MD;  Location: [facility];  Service: Endoscopy;  Laterality: N/A;    HERNIA REPAIR      INCISION AND DRAINAGE OF ABSCESS Left 3/15/2022    Procedure: INCISION AND DRAINAGE, ABSCESS (PERINEUM);  Surgeon: Boogie Pleitez Jr., MD;  Location: [facility];  Service: General;  Laterality: Left;    VASECTOMY       Family History   Problem Relation Name Age of Onset    Alcohol abuse Mother Sabrina     No Known Problems Father      No Known Problems Sister Chanel     No Known Problems Sister Margot     No Known Problems  Brother Johnny      Social History[1]  Review of Systems   Constitutional:  Negative for fever.   HENT:  Negative for sore throat.         Facial abscess   Respiratory:  Negative for shortness of breath.    Cardiovascular:  Negative for chest pain.   Gastrointestinal:  Negative for nausea.   Genitourinary:  Negative for dysuria.   Musculoskeletal:  Negative for back pain.   Skin:  Negative for rash.   Neurological:  Negative for weakness.   Hematological:  Does not bruise/bleed easily.       Physical Exam     Initial Vitals [07/09/25 2130]   BP Pulse Resp Temp SpO2   112/66 68 19 98.1 °F (36.7 °C) 97 %      MAP       --         Physical Exam    Nursing note and vitals reviewed.  Constitutional: He appears well-developed.   HENT:   1 cm diameter area of fluctuance and mild erythema to left cheek.  No extension into the periorbital area.   Eyes: EOM are normal.   Neck:   Normal range of motion.  Cardiovascular:            No murmur heard.  Pulmonary/Chest: No respiratory distress.   Abdominal: He exhibits no distension.   Musculoskeletal:         General: Normal range of motion.      Cervical back: Normal range of motion.     Neurological: He is alert.   Skin: Skin is warm.   Psychiatric: He has a normal mood and affect.         ED Course   I & D - Incision and Drainage    Date/Time: 7/9/2025 11:20 PM  Location procedure was performed: [facility] EMERGENCY DEPARTMENT    Performed by: Michael Mancera MD  Authorized by: Michael Mancera MD  Type: abscess  Body area: head/neck  Location details: face  Anesthesia: local infiltration    Anesthesia:  Local Anesthetic: lidocaine 1% without epinephrine  Scalpel size: 11  Incision type: single straight  Complexity: simple  Drainage: pus  Drainage amount: copious  Wound treatment: incision  Patient tolerance: Patient tolerated the procedure well with no immediate complications        Labs Reviewed - No data to display       Imaging Results    None          Medications    LIDOcaine (PF) 10 mg/ml (1%) injection 50 mg (50 mg Infiltration Given 7/9/25 2230)   sulfamethoxazole-trimethoprim 800-160mg per tablet 1 tablet (1 tablet Oral Given 7/9/25 2313)   ketorolac injection 15 mg (15 mg Intramuscular Given 7/9/25 2316)     Medical Decision Making  DDX: Likely abscess with cellulitis given history, exam. Unlikely necrotizing fasciitis given history, exam, nontoxic appearing, no crepitus, no risk factors.   Dx: Defer.  Tx: Analgesia PRN.  Incision and drainage.  Antibiotics PO.   Dispo: If symptoms controlled, discharge to follow up with PMD within 3-5 days with supportive care recommendations and RTED precautions.      Risk  OTC drugs.  Prescription drug management.                                      Clinical Impression:  Final diagnoses:  [L02.91] Abscess (Primary)          ED Disposition Condition    Discharge Stable          ED Prescriptions       Medication Sig Dispense Start Date End Date Auth. Provider    sulfamethoxazole-trimethoprim 800-160mg (BACTRIM DS) 800-160 mg Tab Take 1 tablet by mouth 2 (two) times daily. for 7 days 14 tablet 7/9/2025 7/16/2025 Michael Mancera MD    acetaminophen (TYLENOL) 500 MG tablet Take 1 tablet (500 mg total) by mouth every 6 (six) hours as needed for Pain. 30 tablet 7/9/2025 -- Michael Mancera MD    ibuprofen (ADVIL,MOTRIN) 600 MG tablet Take 1 tablet (600 mg total) by mouth every 6 (six) hours as needed for Pain. 20 tablet 7/9/2025 -- Michael Mancera MD          Follow-up Information       Follow up With Specialties Details Why Contact Info    Arvin Ramires MD Internal Medicine Schedule an appointment as soon as possible for a visit in 2 days  [address]  [place] [address]  [phone]      [facility] - Emergency Dept Emergency Medicine  If symptoms worsen [address] [place] [id number] [phone]                   [1]   Social History  Tobacco Use    Smoking status: Former     Current packs/day: 0.00      Average packs/day: 0.5 packs/day for 22.3 years (11.1 ttl pk-yrs)     Types: Cigarettes     Start date: 1988     Quit date: 4/16/2010     Years since quitting: 15.2    Smokeless tobacco: Never   Substance Use Topics    Alcohol use: Yes     Alcohol/week: 0.0 standard drinks of alcohol     Comment: occassionally    Drug use: Yes     Frequency: 14.0 times per week     Types: Marijuana     Comment: 1-2 daily        Michael Mancera MD  07/09/25 2201

## (undated) DEVICE — PAD UNDERPAD 30X30

## (undated) DEVICE — SOL WATER STRL IRR 1000ML

## (undated) DEVICE — SUT MONOCYRL 4-0 PS2 UND

## (undated) DEVICE — NDL SAFETY 25G X 1.5 ECLIPSE

## (undated) DEVICE — APPLICATOR CHLORAPREP ORN 26ML

## (undated) DEVICE — GLOVE 8 PROTEXIS PI BLUE

## (undated) DEVICE — SYR W/CANNULA 10CC

## (undated) DEVICE — NDL ECLIPSE SAFETY 18GX1-1/2IN

## (undated) DEVICE — GLOVE PROTEXIS LTX MICRO  7.5

## (undated) DEVICE — TOWELS STERILE 18 X 25.5

## (undated) DEVICE — SPONGE LAP 18X18 PREWASHED

## (undated) DEVICE — PACK SET UP